# Patient Record
Sex: FEMALE | Race: WHITE | NOT HISPANIC OR LATINO | Employment: UNEMPLOYED | URBAN - METROPOLITAN AREA
[De-identification: names, ages, dates, MRNs, and addresses within clinical notes are randomized per-mention and may not be internally consistent; named-entity substitution may affect disease eponyms.]

---

## 2019-09-10 ENCOUNTER — APPOINTMENT (OUTPATIENT)
Dept: RADIOLOGY | Facility: MEDICAL CENTER | Age: 39
End: 2019-09-10
Attending: EMERGENCY MEDICINE
Payer: MEDICAID

## 2019-09-10 ENCOUNTER — HOSPITAL ENCOUNTER (EMERGENCY)
Facility: MEDICAL CENTER | Age: 39
End: 2019-09-10
Attending: EMERGENCY MEDICINE
Payer: MEDICAID

## 2019-09-10 VITALS
RESPIRATION RATE: 20 BRPM | SYSTOLIC BLOOD PRESSURE: 175 MMHG | HEART RATE: 79 BPM | OXYGEN SATURATION: 93 % | BODY MASS INDEX: 26 KG/M2 | WEIGHT: 151.46 LBS | DIASTOLIC BLOOD PRESSURE: 108 MMHG | TEMPERATURE: 98.6 F

## 2019-09-10 DIAGNOSIS — R10.2 PELVIC PAIN: ICD-10-CM

## 2019-09-10 LAB
ALBUMIN SERPL BCP-MCNC: 5 G/DL (ref 3.2–4.9)
ALBUMIN/GLOB SERPL: 1.5 G/DL
ALP SERPL-CCNC: 79 U/L (ref 30–99)
ALT SERPL-CCNC: 29 U/L (ref 2–50)
ANION GAP SERPL CALC-SCNC: 17 MMOL/L (ref 0–11.9)
APPEARANCE UR: ABNORMAL
AST SERPL-CCNC: 15 U/L (ref 12–45)
BACTERIA #/AREA URNS HPF: NEGATIVE /HPF
BACTERIA GENITAL QL WET PREP: NORMAL
BASOPHILS # BLD AUTO: 0.5 % (ref 0–1.8)
BASOPHILS # BLD: 0.09 K/UL (ref 0–0.12)
BILIRUB SERPL-MCNC: 0.5 MG/DL (ref 0.1–1.5)
BILIRUB UR QL STRIP.AUTO: NEGATIVE
BUN SERPL-MCNC: 7 MG/DL (ref 8–22)
CALCIUM SERPL-MCNC: 9.8 MG/DL (ref 8.4–10.2)
CHLORIDE SERPL-SCNC: 104 MMOL/L (ref 96–112)
CO2 SERPL-SCNC: 21 MMOL/L (ref 20–33)
COLOR UR: YELLOW
CREAT SERPL-MCNC: 0.7 MG/DL (ref 0.5–1.4)
EOSINOPHIL # BLD AUTO: 0.09 K/UL (ref 0–0.51)
EOSINOPHIL NFR BLD: 0.5 % (ref 0–6.9)
EPI CELLS #/AREA URNS HPF: ABNORMAL /HPF
ERYTHROCYTE [DISTWIDTH] IN BLOOD BY AUTOMATED COUNT: 39.2 FL (ref 35.9–50)
GLOBULIN SER CALC-MCNC: 3.3 G/DL (ref 1.9–3.5)
GLUCOSE SERPL-MCNC: 143 MG/DL (ref 65–99)
GLUCOSE UR STRIP.AUTO-MCNC: NEGATIVE MG/DL
HCG SERPL QL: NEGATIVE
HCT VFR BLD AUTO: 48.5 % (ref 37–47)
HGB BLD-MCNC: 17.2 G/DL (ref 12–16)
IMM GRANULOCYTES # BLD AUTO: 0.08 K/UL (ref 0–0.11)
IMM GRANULOCYTES NFR BLD AUTO: 0.5 % (ref 0–0.9)
KETONES UR STRIP.AUTO-MCNC: NEGATIVE MG/DL
LEUKOCYTE ESTERASE UR QL STRIP.AUTO: NEGATIVE
LIPASE SERPL-CCNC: 56 U/L (ref 7–58)
LYMPHOCYTES # BLD AUTO: 3.11 K/UL (ref 1–4.8)
LYMPHOCYTES NFR BLD: 18.6 % (ref 22–41)
MCH RBC QN AUTO: 30.7 PG (ref 27–33)
MCHC RBC AUTO-ENTMCNC: 35.5 G/DL (ref 33.6–35)
MCV RBC AUTO: 86.5 FL (ref 81.4–97.8)
MICRO URNS: ABNORMAL
MONOCYTES # BLD AUTO: 1.15 K/UL (ref 0–0.85)
MONOCYTES NFR BLD AUTO: 6.9 % (ref 0–13.4)
NEUTROPHILS # BLD AUTO: 12.18 K/UL (ref 2–7.15)
NEUTROPHILS NFR BLD: 73 % (ref 44–72)
NITRITE UR QL STRIP.AUTO: NEGATIVE
NRBC # BLD AUTO: 0 K/UL
NRBC BLD-RTO: 0 /100 WBC
PH UR STRIP.AUTO: 7.5 [PH] (ref 5–8)
PLATELET # BLD AUTO: 446 K/UL (ref 164–446)
PMV BLD AUTO: 8.1 FL (ref 9–12.9)
POTASSIUM SERPL-SCNC: 3.5 MMOL/L (ref 3.6–5.5)
PROT SERPL-MCNC: 8.3 G/DL (ref 6–8.2)
PROT UR QL STRIP: NEGATIVE MG/DL
RBC # BLD AUTO: 5.61 M/UL (ref 4.2–5.4)
RBC # URNS HPF: ABNORMAL /HPF
RBC UR QL AUTO: ABNORMAL
SIGNIFICANT IND 70042: NORMAL
SITE SITE: NORMAL
SODIUM SERPL-SCNC: 142 MMOL/L (ref 135–145)
SOURCE SOURCE: NORMAL
SP GR UR STRIP.AUTO: 1.01
WBC # BLD AUTO: 16.7 K/UL (ref 4.8–10.8)
WBC #/AREA URNS HPF: ABNORMAL /HPF

## 2019-09-10 PROCEDURE — 87591 N.GONORRHOEAE DNA AMP PROB: CPT

## 2019-09-10 PROCEDURE — 99285 EMERGENCY DEPT VISIT HI MDM: CPT

## 2019-09-10 PROCEDURE — 96376 TX/PRO/DX INJ SAME DRUG ADON: CPT

## 2019-09-10 PROCEDURE — 96375 TX/PRO/DX INJ NEW DRUG ADDON: CPT

## 2019-09-10 PROCEDURE — 83690 ASSAY OF LIPASE: CPT

## 2019-09-10 PROCEDURE — 96374 THER/PROPH/DIAG INJ IV PUSH: CPT

## 2019-09-10 PROCEDURE — 80053 COMPREHEN METABOLIC PANEL: CPT

## 2019-09-10 PROCEDURE — 36415 COLL VENOUS BLD VENIPUNCTURE: CPT

## 2019-09-10 PROCEDURE — 700105 HCHG RX REV CODE 258: Performed by: EMERGENCY MEDICINE

## 2019-09-10 PROCEDURE — 85025 COMPLETE CBC W/AUTO DIFF WBC: CPT

## 2019-09-10 PROCEDURE — 87491 CHLMYD TRACH DNA AMP PROBE: CPT

## 2019-09-10 PROCEDURE — 74176 CT ABD & PELVIS W/O CONTRAST: CPT

## 2019-09-10 PROCEDURE — 76856 US EXAM PELVIC COMPLETE: CPT

## 2019-09-10 PROCEDURE — 84703 CHORIONIC GONADOTROPIN ASSAY: CPT

## 2019-09-10 PROCEDURE — 81001 URINALYSIS AUTO W/SCOPE: CPT

## 2019-09-10 PROCEDURE — 700111 HCHG RX REV CODE 636 W/ 250 OVERRIDE (IP): Performed by: EMERGENCY MEDICINE

## 2019-09-10 RX ORDER — MORPHINE SULFATE 4 MG/ML
4 INJECTION, SOLUTION INTRAMUSCULAR; INTRAVENOUS ONCE
Status: COMPLETED | OUTPATIENT
Start: 2019-09-10 | End: 2019-09-10

## 2019-09-10 RX ORDER — ONDANSETRON 2 MG/ML
4 INJECTION INTRAMUSCULAR; INTRAVENOUS ONCE
Status: COMPLETED | OUTPATIENT
Start: 2019-09-10 | End: 2019-09-10

## 2019-09-10 RX ORDER — SODIUM CHLORIDE 9 MG/ML
1000 INJECTION, SOLUTION INTRAVENOUS ONCE
Status: COMPLETED | OUTPATIENT
Start: 2019-09-10 | End: 2019-09-10

## 2019-09-10 RX ADMIN — MORPHINE SULFATE 4 MG: 4 INJECTION INTRAVENOUS at 13:16

## 2019-09-10 RX ADMIN — ONDANSETRON 4 MG: 2 INJECTION INTRAMUSCULAR; INTRAVENOUS at 10:54

## 2019-09-10 RX ADMIN — SODIUM CHLORIDE 1000 ML: 9 INJECTION, SOLUTION INTRAVENOUS at 10:54

## 2019-09-10 RX ADMIN — FENTANYL CITRATE 100 MCG: 50 INJECTION, SOLUTION INTRAMUSCULAR; INTRAVENOUS at 10:54

## 2019-09-10 RX ADMIN — ONDANSETRON 4 MG: 2 INJECTION INTRAMUSCULAR; INTRAVENOUS at 13:17

## 2019-09-10 RX ADMIN — FENTANYL CITRATE 100 MCG: 50 INJECTION, SOLUTION INTRAMUSCULAR; INTRAVENOUS at 11:39

## 2019-09-10 ASSESSMENT — PAIN DESCRIPTION - DESCRIPTORS: DESCRIPTORS: SHARP

## 2019-09-10 NOTE — ED NOTES
"Attempted to review discharge paperwork w/ pt.  Pt refused to review or sign paperwork, stated \"abdominal pain is not my complaint.\"  Pt stated has called for a cab ride to [facility], instructed not to drive after receiving Narcotics.  Pt stated, \"I am a mother, I'm not going to drive.\"  Again offered for pt to speak to another ERP, apologized pt unhappy w/ care.  Pt stated, \"no you have been nice, but I'm not sure the doctor cares.  Pt declined reassessment of VS, again refused review of discharge paperwork.  Pt ambulated from ED.    "

## 2019-09-10 NOTE — ED NOTES
"Pt restless, anxious, dry heaving, rocking back and forth on gurney.  Pt c/o \"severe\" pain to RLQ, stating \"I know I have a torsion again.\"  Pt repositioned on gurney for comfort.  Pt reports pain started around 0200 this morning, around 0400 began to have increased N/V, unable to get up off BR floor without assistance from family member.  Pt reports has HX of irregular cycles, states this pain feels completely different.  Pt also c/o increased bleeding w/ clots, states is abnormal from regular cycles.  1048:  PIV placed in RAC, blood drawn and sent to lab.    1054:  Medicated as ordered  1125:  US at bedside    "

## 2019-09-10 NOTE — ED NOTES
"Pt ambulated out of the room to nurses station, stated \"I want that information sent to the other [facility].  I will be going to them to get better care.\"  Asked pt if would like to speak to another ERP, pt stated \"No, I will go there where I can get Gynecological care.\"  Offered again for pt to speak to another ERP, pt declined.    "

## 2019-09-10 NOTE — ED NOTES
Med rec updated and complete  Allergies reviewed  Pt reports no vitamins.   Pt reports no antibiotics in the last 2 weeks

## 2019-09-10 NOTE — ED NOTES
Rounded on patient.  Medicated per orders.  Patient asked about food, was asked to wait and see if nausea and vomiting subsides then ERP will be asked.

## 2019-09-10 NOTE — ED PROVIDER NOTES
ED Provider Note    CHIEF COMPLAINT  Chief Complaint   Patient presents with   • Groin Pain     ovaries right         HPI  Alan Penaloza is a 38 y.o. female who presents with abdominal pain.  Started last night.  In the groin.  Severe relatively rapid onset.  Sharp stabbing.  Radiates to the right side.  Unbearable pain.  She does have heavy painful periods, but this seems worse than normal.  She just started menses last night.  She has had associated nausea and has been vomiting.  No diarrhea.  Is not had a fever or chills.  Denies chest pain shortness of breath, abnormal foods, known ill exposure.  No dysuria hematuria frequency.    Patient does have a history of recurrent abdominal pain.  She reports that she frequently will get kidney stones when she menstruates.  She has had an ovarian torsion on the left and is worried she might have a torsion on the right.  She has a history of endometriosis.  She had been seeing pain management for her chronic neck and back pain.  She ended up having a blood alcohol test that was positive and care was discontinued.  She has not taken morphine or Norco in the last month because of this.    REVIEW OF SYSTEMS  As above  All other systems are negative.     PAST MEDICAL HISTORY  Past Medical History:   Diagnosis Date   • Abdominal pain 1/13/2011   • Endometriosis    • Hydronephrosis, right 1/13/2011   • Kidney stones     renal stent placement   • Ovarian torsion    • Psychiatric problem     anxiety   • Renal calculi 1/13/2011   • Right flank pain 1/13/2011       FAMILY HISTORY  History reviewed. No pertinent family history.    SOCIAL HISTORY  Social History     Tobacco Use   • Smoking status: Current Every Day Smoker   • Smokeless tobacco: Never Used   Substance Use Topics   • Alcohol use: No   • Drug use: No       SURGICAL HISTORY  Past Surgical History:   Procedure Laterality Date   • GYN SURGERY      left ovary removed 2007   • OTHER ABDOMINAL SURGERY      intestinal  surgery 1980       CURRENT MEDICATIONS  Home Medications     Reviewed by Atilio Hardy (Pharmacy Tech) on 09/10/19 at 1041  Med List Status: Complete   Medication Last Dose Status   naproxen (NAPROSYN) 250 MG TABS 9/10/2019 Active   Pseudoephedrine HCl (SUDAFED PO) 9/9/2019 Active   Pseudoephedrine-APAP-DM (QC DAYTIME COLD MEDICINE PO) 9/9/2019 Active                ALLERGIES  Allergies   Allergen Reactions   • Ativan [Lorazepam]      Pt reports that it makes her hallucinate    • Droperidol Hives and Shortness of Breath   • Iodine Rash   • Penicillins Hives   • Phenergan [Promethazine Hcl] Hives   • Reglan [Metoclopramide Hcl] Hives   • Rocephin [Ceftriaxone Sodium] Hives   • Toradol Vomiting and Nausea       PHYSICAL EXAM  VITAL SIGNS: BP (!) 230/159   Pulse 82   Temp 37 °C (98.6 °F) (Temporal)   Resp 20   Wt 68.7 kg (151 lb 7.3 oz)   SpO2 98%   BMI 26.00 kg/m²   Constitutional: Awake and alert.  Actively throwing up in a trash can   hENT: Dry mucous membranes  Eyes: Sclera white  Neck: Normal range of motion  Cardiovascular: Normal heart rate, Normal rhythm  Thorax & Lungs: Normal breath sounds, No respiratory distress, No wheezing, No chest tenderness.   Abdomen: Tender to palpation across the lower abdomen worse in the right than on the left.  No peritonitis  Pelvic Exam:   Normal external genitalia with Normal vulva.  There is menstrual blood within the vault.  No adnexal mass.  There is pelvic tenderness diffusely.  No chandelier sign.  Skin: No rash.   Back: No tenderness, No CVA tenderness.   Extremities: Intact, symmetric distal pulses, no edema.  Neurologic: Grossly normal    RADIOLOGY/PROCEDURES  CT-RENAL COLIC EVALUATION(A/P W/O)   Final Result      1.  Small nonobstructing LEFT kidney stones.   2.  No ureteral stone or hydronephrosis.   3.  Probable small LEFT kidney cysts although evaluation is limited due to lack of IV contrast.   4.  Normal appendix.   5.  No focal mesenteric  inflammatory process demonstrated.      US-PELVIC COMPLETE (TRANSABDOMINAL/TRANSVAGINAL) (COMBO)   Final Result         A complex 1.6 cm cystic lesion in the right ovary with echogenic component could relate to a complex ovarian cyst. Follow-up is recommended to ensure resolution         Imaging is interpreted by radiologist    Labs:   Results for orders placed or performed during the hospital encounter of 09/10/19   CBC WITH DIFFERENTIAL   Result Value Ref Range    WBC 16.7 (H) 4.8 - 10.8 K/uL    RBC 5.61 (H) 4.20 - 5.40 M/uL    Hemoglobin 17.2 (H) 12.0 - 16.0 g/dL    Hematocrit 48.5 (H) 37.0 - 47.0 %    MCV 86.5 81.4 - 97.8 fL    MCH 30.7 27.0 - 33.0 pg    MCHC 35.5 (H) 33.6 - 35.0 g/dL    RDW 39.2 35.9 - 50.0 fL    Platelet Count 446 164 - 446 K/uL    MPV 8.1 (L) 9.0 - 12.9 fL    Neutrophils-Polys 73.00 (H) 44.00 - 72.00 %    Lymphocytes 18.60 (L) 22.00 - 41.00 %    Monocytes 6.90 0.00 - 13.40 %    Eosinophils 0.50 0.00 - 6.90 %    Basophils 0.50 0.00 - 1.80 %    Immature Granulocytes 0.50 0.00 - 0.90 %    Nucleated RBC 0.00 /100 WBC    Neutrophils (Absolute) 12.18 (H) 2.00 - 7.15 K/uL    Lymphs (Absolute) 3.11 1.00 - 4.80 K/uL    Monos (Absolute) 1.15 (H) 0.00 - 0.85 K/uL    Eos (Absolute) 0.09 0.00 - 0.51 K/uL    Baso (Absolute) 0.09 0.00 - 0.12 K/uL    Immature Granulocytes (abs) 0.08 0.00 - 0.11 K/uL    NRBC (Absolute) 0.00 K/uL   COMP METABOLIC PANEL   Result Value Ref Range    Sodium 142 135 - 145 mmol/L    Potassium 3.5 (L) 3.6 - 5.5 mmol/L    Chloride 104 96 - 112 mmol/L    Co2 21 20 - 33 mmol/L    Anion Gap 17.0 (H) 0.0 - 11.9    Glucose 143 (H) 65 - 99 mg/dL    Bun 7 (L) 8 - 22 mg/dL    Creatinine 0.70 0.50 - 1.40 mg/dL    Calcium 9.8 8.4 - 10.2 mg/dL    AST(SGOT) 15 12 - 45 U/L    ALT(SGPT) 29 2 - 50 U/L    Alkaline Phosphatase 79 30 - 99 U/L    Total Bilirubin 0.5 0.1 - 1.5 mg/dL    Albumin 5.0 (H) 3.2 - 4.9 g/dL    Total Protein 8.3 (H) 6.0 - 8.2 g/dL    Globulin 3.3 1.9 - 3.5 g/dL    A-G Ratio 1.5  g/dL   LIPASE   Result Value Ref Range    Lipase 56 7 - 58 U/L   URINALYSIS CULTURE, IF INDICATED   Result Value Ref Range    Color Yellow     Character Hazy (A)     Specific Gravity 1.010 <1.035    Ph 7.5 5.0 - 8.0    Glucose Negative Negative mg/dL    Ketones Negative Negative mg/dL    Protein Negative Negative mg/dL    Bilirubin Negative Negative    Nitrite Negative Negative    Leukocyte Esterase Negative Negative    Occult Blood Large (A) Negative    Micro Urine Req Microscopic    HCG QUAL SERUM   Result Value Ref Range    Beta-Hcg Qualitative Serum Negative Negative   ESTIMATED GFR   Result Value Ref Range    GFR If African American >60 >60 mL/min/1.73 m 2    GFR If Non African American >60 >60 mL/min/1.73 m 2   URINE MICROSCOPIC (W/UA)   Result Value Ref Range    WBC Rare /hpf    RBC 20-50 (A) /hpf    Bacteria Negative None /hpf    Epithelial Cells Rare Few /hpf       Medications   NS infusion 1,000 mL (0 mL Intravenous Stopped 9/10/19 1217)   fentaNYL (SUBLIMAZE) injection 100 mcg (100 mcg Intravenous Given 9/10/19 1054)   ondansetron (ZOFRAN) syringe/vial injection 4 mg (4 mg Intravenous Given 9/10/19 1054)   fentaNYL (SUBLIMAZE) injection 100 mcg (100 mcg Intravenous Given 9/10/19 1139)   morphine (pf) 4 mg/ml injection 4 mg (4 mg Intravenous Given 9/10/19 1316)   ondansetron (ZOFRAN) syringe/vial injection 4 mg (4 mg Intravenous Given 9/10/19 1317)       Hydration: Patient was given IV fluids because of acute vomiting.  Oral fluids were not appropriate because of a possible surgical problem.  On recheck the patient was stable    Narcotic score 450.    COURSE & MEDICAL DECISION MAKING  Presents with severe right lower quadrant abdominal pain.  Was hypertensive and actively vomiting upon presentation.  An IV was started.  She was given fentanyl intravenously.  She was hydrated with saline.  Required additional doses of fentanyl and Zofran with persistent symptoms.  Obtained a pelvic ultrasound initially  while she was having severe pain.  She has good flow to the ovary.  Given that she had severe symptoms during ultrasound torsion felt unlikely.  She does have a small cyst but does not appear to be of any emergent consequence.  Also within the differential is appendicitis and kidney stone.  She has an iodine allergy.  CT scan without contrast was obtained.  She does not have right-sided ureteral stone.  The appendix was seen was normal.  She did not have any findings consistent with PID on her examination.  She is noted to have an elevated WBC count, but this is about the same as they have been during her previous visits and on review of the chart it appears she has chronic pelvic pain.  She has chronic opiate use that was recently discontinued.  This is all likely complicating her recurrent pelvic discomfort.  After the patient's time in the ER she does have improved symptoms.  I do not have an explanation for her pain presently, but does not appear to have an emergency process.  At this point she will be discharged.  I advised Tylenol and/or ibuprofen.  Advised she would need to see a pain management provider for opiates if needed given this sounds to be a rather chronic recurrent problem..  I have given her the number for gynecology to call, Dr. Hartman.  Given the number for the [facility] clinic.  Precaution patient to return the ER for fever, uncontrolled symptoms or concern.      FINAL IMPRESSION  1.  Pelvic pain  2.  Chronic recurrent pain syndrome  3.  Chronic opiate use with recent discontinuation        This dictation was created using voice recognition software. The accuracy of the dictation is limited to the abilities of the software.  The nursing notes were reviewed and certain aspects of this information were incorporated into this note.    Electronically signed by: Oscar Livingston, 9/10/2019 1:19 PM

## 2019-09-11 LAB
C TRACH DNA SPEC QL NAA+PROBE: NEGATIVE
N GONORRHOEA DNA SPEC QL NAA+PROBE: NEGATIVE
SPECIMEN SOURCE: NORMAL

## 2020-01-18 ENCOUNTER — APPOINTMENT (OUTPATIENT)
Dept: RADIOLOGY | Facility: MEDICAL CENTER | Age: 40
End: 2020-01-18
Attending: EMERGENCY MEDICINE
Payer: MEDICAID

## 2020-01-18 ENCOUNTER — HOSPITAL ENCOUNTER (EMERGENCY)
Facility: MEDICAL CENTER | Age: 40
End: 2020-01-18
Attending: EMERGENCY MEDICINE
Payer: MEDICAID

## 2020-01-18 VITALS
SYSTOLIC BLOOD PRESSURE: 177 MMHG | BODY MASS INDEX: 25.01 KG/M2 | TEMPERATURE: 98.6 F | DIASTOLIC BLOOD PRESSURE: 110 MMHG | WEIGHT: 165 LBS | HEIGHT: 68 IN | HEART RATE: 94 BPM | RESPIRATION RATE: 18 BRPM | OXYGEN SATURATION: 95 %

## 2020-01-18 DIAGNOSIS — S09.90XA CLOSED HEAD INJURY, INITIAL ENCOUNTER: ICD-10-CM

## 2020-01-18 DIAGNOSIS — S16.1XXA STRAIN OF NECK MUSCLE, INITIAL ENCOUNTER: ICD-10-CM
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
## 2020-01-18 DIAGNOSIS — T14.8XXA ABRASION: ICD-10-CM

## 2020-01-18 LAB
ABO GROUP BLD: NORMAL
ALBUMIN SERPL BCP-MCNC: 4.5 G/DL (ref 3.2–4.9)
ALBUMIN/GLOB SERPL: 1.4 G/DL
ALP SERPL-CCNC: 81 U/L (ref 30–99)
ALT SERPL-CCNC: 17 U/L (ref 2–50)
ANION GAP SERPL CALC-SCNC: 10 MMOL/L (ref 0–11.9)
APTT PPP: 28.3 SEC (ref 24.7–36)
AST SERPL-CCNC: 14 U/L (ref 12–45)
BILIRUB SERPL-MCNC: 0.4 MG/DL (ref 0.1–1.5)
BLD GP AB SCN SERPL QL: NORMAL
BUN SERPL-MCNC: 11 MG/DL (ref 8–22)
CALCIUM SERPL-MCNC: 9.4 MG/DL (ref 8.5–10.5)
CHLORIDE SERPL-SCNC: 107 MMOL/L (ref 96–112)
CO2 SERPL-SCNC: 24 MMOL/L (ref 20–33)
CREAT SERPL-MCNC: 0.86 MG/DL (ref 0.5–1.4)
ERYTHROCYTE [DISTWIDTH] IN BLOOD BY AUTOMATED COUNT: 43.7 FL (ref 35.9–50)
ETHANOL BLD-MCNC: 0.18 G/DL
GLOBULIN SER CALC-MCNC: 3.2 G/DL (ref 1.9–3.5)
GLUCOSE SERPL-MCNC: 131 MG/DL (ref 65–99)
HCG SERPL QL: NEGATIVE
HCT VFR BLD AUTO: 47.5 % (ref 37–47)
HGB BLD-MCNC: 16.3 G/DL (ref 12–16)
INR PPP: 0.88 (ref 0.87–1.13)
MCH RBC QN AUTO: 30.9 PG (ref 27–33)
MCHC RBC AUTO-ENTMCNC: 34.3 G/DL (ref 33.6–35)
MCV RBC AUTO: 90.1 FL (ref 81.4–97.8)
PLATELET # BLD AUTO: 367 K/UL (ref 164–446)
PMV BLD AUTO: 8.1 FL (ref 9–12.9)
POTASSIUM SERPL-SCNC: 3.3 MMOL/L (ref 3.6–5.5)
PROT SERPL-MCNC: 7.7 G/DL (ref 6–8.2)
PROTHROMBIN TIME: 12.1 SEC (ref 12–14.6)
RBC # BLD AUTO: 5.27 M/UL (ref 4.2–5.4)
RH BLD: NORMAL
SODIUM SERPL-SCNC: 141 MMOL/L (ref 135–145)
WBC # BLD AUTO: 14.2 K/UL (ref 4.8–10.8)

## 2020-01-18 PROCEDURE — 85027 COMPLETE CBC AUTOMATED: CPT

## 2020-01-18 PROCEDURE — 96374 THER/PROPH/DIAG INJ IV PUSH: CPT

## 2020-01-18 PROCEDURE — 85730 THROMBOPLASTIN TIME PARTIAL: CPT

## 2020-01-18 PROCEDURE — 71250 CT THORAX DX C-: CPT

## 2020-01-18 PROCEDURE — 80053 COMPREHEN METABOLIC PANEL: CPT

## 2020-01-18 PROCEDURE — 80307 DRUG TEST PRSMV CHEM ANLYZR: CPT

## 2020-01-18 PROCEDURE — 70486 CT MAXILLOFACIAL W/O DYE: CPT

## 2020-01-18 PROCEDURE — 84703 CHORIONIC GONADOTROPIN ASSAY: CPT

## 2020-01-18 PROCEDURE — A9270 NON-COVERED ITEM OR SERVICE: HCPCS | Performed by: EMERGENCY MEDICINE

## 2020-01-18 PROCEDURE — 70450 CT HEAD/BRAIN W/O DYE: CPT

## 2020-01-18 PROCEDURE — 72125 CT NECK SPINE W/O DYE: CPT

## 2020-01-18 PROCEDURE — 99285 EMERGENCY DEPT VISIT HI MDM: CPT

## 2020-01-18 PROCEDURE — 86850 RBC ANTIBODY SCREEN: CPT

## 2020-01-18 PROCEDURE — 700111 HCHG RX REV CODE 636 W/ 250 OVERRIDE (IP): Performed by: EMERGENCY MEDICINE

## 2020-01-18 PROCEDURE — 72131 CT LUMBAR SPINE W/O DYE: CPT

## 2020-01-18 PROCEDURE — 700102 HCHG RX REV CODE 250 W/ 637 OVERRIDE(OP): Performed by: EMERGENCY MEDICINE

## 2020-01-18 PROCEDURE — 72128 CT CHEST SPINE W/O DYE: CPT

## 2020-01-18 PROCEDURE — 86900 BLOOD TYPING SEROLOGIC ABO: CPT

## 2020-01-18 PROCEDURE — 307740 HCHG GREEN TRAUMA TEAM SERVICES

## 2020-01-18 PROCEDURE — 86901 BLOOD TYPING SEROLOGIC RH(D): CPT

## 2020-01-18 PROCEDURE — 85610 PROTHROMBIN TIME: CPT

## 2020-01-18 RX ORDER — HYDROCODONE BITARTRATE AND ACETAMINOPHEN 5; 325 MG/1; MG/1
1 TABLET ORAL EVERY 6 HOURS PRN
Qty: 10 TAB | Refills: 0 | Status: SHIPPED | OUTPATIENT
Start: 2020-01-18 | End: 2020-01-23

## 2020-01-18 RX ORDER — ACETAMINOPHEN 325 MG/1
650 TABLET ORAL ONCE
Status: COMPLETED | OUTPATIENT
Start: 2020-01-18 | End: 2020-01-18

## 2020-01-18 RX ORDER — HYDROCODONE BITARTRATE AND ACETAMINOPHEN 5; 325 MG/1; MG/1
1 TABLET ORAL ONCE
Status: DISCONTINUED | OUTPATIENT
Start: 2020-01-18 | End: 2020-01-18

## 2020-01-18 RX ORDER — ONDANSETRON 2 MG/ML
4 INJECTION INTRAMUSCULAR; INTRAVENOUS ONCE
Status: COMPLETED | OUTPATIENT
Start: 2020-01-18 | End: 2020-01-18

## 2020-01-18 RX ADMIN — ACETAMINOPHEN 650 MG: 325 TABLET, FILM COATED ORAL at 19:30

## 2020-01-18 RX ADMIN — ONDANSETRON 4 MG: 2 INJECTION INTRAMUSCULAR; INTRAVENOUS at 20:01

## 2020-01-18 ASSESSMENT — LIFESTYLE VARIABLES
HAVE YOU EVER FELT YOU SHOULD CUT DOWN ON YOUR DRINKING: NO
EVER HAD A DRINK FIRST THING IN THE MORNING TO STEADY YOUR NERVES TO GET RID OF A HANGOVER: NO
HOW MANY TIMES IN THE PAST YEAR HAVE YOU HAD 5 OR MORE DRINKS IN A DAY: 3
ON A TYPICAL DAY WHEN YOU DRINK ALCOHOL HOW MANY DRINKS DO YOU HAVE: 1
DO YOU DRINK ALCOHOL: YES
EVER FELT BAD OR GUILTY ABOUT YOUR DRINKING: NO
CONSUMPTION TOTAL: POSITIVE
AVERAGE NUMBER OF DAYS PER WEEK YOU HAVE A DRINK CONTAINING ALCOHOL: 1
TOTAL SCORE: 0
HAVE PEOPLE ANNOYED YOU BY CRITICIZING YOUR DRINKING: NO

## 2020-01-18 ASSESSMENT — ENCOUNTER SYMPTOMS
HEADACHES: 1
NECK PAIN: 1
BACK PAIN: 1
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
## 2020-01-19 NOTE — ED NOTES
Pt ambuating and A & O x 4. Pt's sister at bedside and pt's father waiting for pt in lobby to provide ride home. Pt verbalized understanding to instructions and prescriptions. Pt given w/c ride to lobby.
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
## 2020-02-04 ENCOUNTER — HOSPITAL ENCOUNTER (EMERGENCY)
Facility: MEDICAL CENTER | Age: 40
End: 2020-02-04
Attending: EMERGENCY MEDICINE
Payer: MEDICAID

## 2020-02-04 VITALS
TEMPERATURE: 97.5 F | WEIGHT: 167.99 LBS | RESPIRATION RATE: 18 BRPM | DIASTOLIC BLOOD PRESSURE: 100 MMHG | HEART RATE: 90 BPM | SYSTOLIC BLOOD PRESSURE: 178 MMHG | OXYGEN SATURATION: 99 % | BODY MASS INDEX: 27.99 KG/M2 | HEIGHT: 65 IN

## 2020-02-04 DIAGNOSIS — F07.81 POST CONCUSSIVE SYNDROME: ICD-10-CM

## 2020-02-04 PROCEDURE — 99283 EMERGENCY DEPT VISIT LOW MDM: CPT

## 2020-02-04 RX ORDER — HYDROCODONE BITARTRATE AND ACETAMINOPHEN 5; 325 MG/1; MG/1
1-2 TABLET ORAL EVERY 6 HOURS PRN
Qty: 15 TAB | Refills: 0 | Status: SHIPPED | OUTPATIENT
Start: 2020-02-04 | End: 2020-02-08

## 2020-02-04 RX ORDER — METHYLPREDNISOLONE 4 MG/1
TABLET ORAL
Qty: 1 PACKAGE | Refills: 0 | Status: SHIPPED | OUTPATIENT
Start: 2020-02-04 | End: 2020-03-22

## 2020-02-04 RX ORDER — ONDANSETRON 4 MG/1
4 TABLET, ORALLY DISINTEGRATING ORAL EVERY 6 HOURS PRN
Qty: 10 TAB | Refills: 1 | Status: SHIPPED | OUTPATIENT
Start: 2020-02-04 | End: 2020-03-22

## 2020-02-04 RX ORDER — LISINOPRIL 10 MG/1
10 TABLET ORAL DAILY
Qty: 30 TAB | Refills: 0 | Status: ON HOLD | OUTPATIENT
Start: 2020-02-04 | End: 2020-03-25 | Stop reason: SDUPTHER
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
## 2020-02-04 NOTE — ED NOTES
Pt provided discharge instructions. Pt verbalized understanding. Pt leaving ER in stable condition, pt ambulatory with steady gait.

## 2020-02-04 NOTE — ED NOTES
Pt to room from [facility]. Changed into gown. Connected to monitor. Agree with triage note. Chart up for ERP. Call light in reach.       Pt complaining of foggy vision as well. Pt has been taking napoxen, complaining of stiff neck and that she cannot turn her neck w/o pain from side to side
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
## 2020-03-22 ENCOUNTER — APPOINTMENT (OUTPATIENT)
Dept: RADIOLOGY | Facility: MEDICAL CENTER | Age: 40
End: 2020-03-22
Attending: INTERNAL MEDICINE
Payer: MEDICAID

## 2020-03-22 ENCOUNTER — HOSPITAL ENCOUNTER (OUTPATIENT)
Facility: MEDICAL CENTER | Age: 40
End: 2020-03-25
Attending: EMERGENCY MEDICINE | Admitting: INTERNAL MEDICINE
Payer: MEDICAID

## 2020-03-22 ENCOUNTER — APPOINTMENT (OUTPATIENT)
Dept: RADIOLOGY | Facility: MEDICAL CENTER | Age: 40
End: 2020-03-22
Attending: EMERGENCY MEDICINE
Payer: MEDICAID

## 2020-03-22 DIAGNOSIS — K85.90 ACUTE PANCREATITIS, UNSPECIFIED COMPLICATION STATUS, UNSPECIFIED PANCREATITIS TYPE: ICD-10-CM

## 2020-03-22 DIAGNOSIS — R07.9 CHEST PAIN, UNSPECIFIED TYPE: ICD-10-CM

## 2020-03-22 DIAGNOSIS — F41.9 ANXIETY: ICD-10-CM

## 2020-03-22 PROBLEM — Z72.0 TOBACCO ABUSE: Status: ACTIVE | Noted: 2020-03-22

## 2020-03-22 PROBLEM — N17.9 AKI (ACUTE KIDNEY INJURY) (HCC): Status: ACTIVE | Noted: 2020-03-22

## 2020-03-22 LAB
ALBUMIN SERPL BCP-MCNC: 4.4 G/DL (ref 3.2–4.9)
ALBUMIN/GLOB SERPL: 1.4 G/DL
ALP SERPL-CCNC: 67 U/L (ref 30–99)
ALT SERPL-CCNC: 12 U/L (ref 2–50)
AMPHET UR QL SCN: NEGATIVE
ANION GAP SERPL CALC-SCNC: 13 MMOL/L (ref 7–16)
AST SERPL-CCNC: 15 U/L (ref 12–45)
BARBITURATES UR QL SCN: NEGATIVE
BASOPHILS # BLD AUTO: 0.7 % (ref 0–1.8)
BASOPHILS # BLD: 0.1 K/UL (ref 0–0.12)
BENZODIAZ UR QL SCN: POSITIVE
BILIRUB SERPL-MCNC: 0.3 MG/DL (ref 0.1–1.5)
BUN SERPL-MCNC: 30 MG/DL (ref 8–22)
BZE UR QL SCN: NEGATIVE
CALCIUM SERPL-MCNC: 9.4 MG/DL (ref 8.5–10.5)
CANNABINOIDS UR QL SCN: POSITIVE
CHLORIDE SERPL-SCNC: 105 MMOL/L (ref 96–112)
CO2 SERPL-SCNC: 18 MMOL/L (ref 20–33)
CREAT SERPL-MCNC: 1.15 MG/DL (ref 0.5–1.4)
D DIMER PPP IA.FEU-MCNC: 0.27 UG/ML (FEU) (ref 0–0.5)
EKG IMPRESSION: NORMAL
EOSINOPHIL # BLD AUTO: 0.17 K/UL (ref 0–0.51)
EOSINOPHIL NFR BLD: 1.2 % (ref 0–6.9)
ERYTHROCYTE [DISTWIDTH] IN BLOOD BY AUTOMATED COUNT: 43.8 FL (ref 35.9–50)
GLOBULIN SER CALC-MCNC: 3.1 G/DL (ref 1.9–3.5)
GLUCOSE SERPL-MCNC: 82 MG/DL (ref 65–99)
HCG SERPL QL: NEGATIVE
HCT VFR BLD AUTO: 41.9 % (ref 37–47)
HGB BLD-MCNC: 14.4 G/DL (ref 12–16)
IMM GRANULOCYTES # BLD AUTO: 0.06 K/UL (ref 0–0.11)
IMM GRANULOCYTES NFR BLD AUTO: 0.4 % (ref 0–0.9)
LIPASE SERPL-CCNC: 87 U/L (ref 11–82)
LYMPHOCYTES # BLD AUTO: 4.32 K/UL (ref 1–4.8)
LYMPHOCYTES NFR BLD: 31.5 % (ref 22–41)
MCH RBC QN AUTO: 31.9 PG (ref 27–33)
MCHC RBC AUTO-ENTMCNC: 34.4 G/DL (ref 33.6–35)
MCV RBC AUTO: 92.7 FL (ref 81.4–97.8)
METHADONE UR QL SCN: NEGATIVE
MONOCYTES # BLD AUTO: 1.53 K/UL (ref 0–0.85)
MONOCYTES NFR BLD AUTO: 11.2 % (ref 0–13.4)
NEUTROPHILS # BLD AUTO: 7.54 K/UL (ref 2–7.15)
NEUTROPHILS NFR BLD: 55 % (ref 44–72)
NRBC # BLD AUTO: 0 K/UL
NRBC BLD-RTO: 0 /100 WBC
OPIATES UR QL SCN: POSITIVE
OXYCODONE UR QL SCN: NEGATIVE
PCP UR QL SCN: NEGATIVE
PLATELET # BLD AUTO: 356 K/UL (ref 164–446)
PMV BLD AUTO: 8.3 FL (ref 9–12.9)
POTASSIUM SERPL-SCNC: 4.3 MMOL/L (ref 3.6–5.5)
PROPOXYPH UR QL SCN: NEGATIVE
PROT SERPL-MCNC: 7.5 G/DL (ref 6–8.2)
RBC # BLD AUTO: 4.52 M/UL (ref 4.2–5.4)
SODIUM SERPL-SCNC: 136 MMOL/L (ref 135–145)
TROPONIN T SERPL-MCNC: 7 NG/L (ref 6–19)
WBC # BLD AUTO: 13.7 K/UL (ref 4.8–10.8)

## 2020-03-22 PROCEDURE — 700105 HCHG RX REV CODE 258: Performed by: EMERGENCY MEDICINE

## 2020-03-22 PROCEDURE — A9270 NON-COVERED ITEM OR SERVICE: HCPCS | Performed by: INTERNAL MEDICINE

## 2020-03-22 PROCEDURE — 96374 THER/PROPH/DIAG INJ IV PUSH: CPT | Mod: XU

## 2020-03-22 PROCEDURE — G0378 HOSPITAL OBSERVATION PER HR: HCPCS

## 2020-03-22 PROCEDURE — 700111 HCHG RX REV CODE 636 W/ 250 OVERRIDE (IP): Performed by: EMERGENCY MEDICINE

## 2020-03-22 PROCEDURE — 83690 ASSAY OF LIPASE: CPT

## 2020-03-22 PROCEDURE — 80053 COMPREHEN METABOLIC PANEL: CPT

## 2020-03-22 PROCEDURE — 99285 EMERGENCY DEPT VISIT HI MDM: CPT

## 2020-03-22 PROCEDURE — 700102 HCHG RX REV CODE 250 W/ 637 OVERRIDE(OP): Performed by: EMERGENCY MEDICINE

## 2020-03-22 PROCEDURE — 93005 ELECTROCARDIOGRAM TRACING: CPT | Performed by: EMERGENCY MEDICINE

## 2020-03-22 PROCEDURE — 99220 PR INITIAL OBSERVATION CARE,LEVL III: CPT | Mod: 25 | Performed by: INTERNAL MEDICINE

## 2020-03-22 PROCEDURE — 80307 DRUG TEST PRSMV CHEM ANLYZR: CPT

## 2020-03-22 PROCEDURE — 74175 CTA ABDOMEN W/CONTRAST: CPT

## 2020-03-22 PROCEDURE — 84484 ASSAY OF TROPONIN QUANT: CPT

## 2020-03-22 PROCEDURE — A9270 NON-COVERED ITEM OR SERVICE: HCPCS | Performed by: EMERGENCY MEDICINE

## 2020-03-22 PROCEDURE — 85025 COMPLETE CBC W/AUTO DIFF WBC: CPT

## 2020-03-22 PROCEDURE — 700102 HCHG RX REV CODE 250 W/ 637 OVERRIDE(OP): Performed by: INTERNAL MEDICINE

## 2020-03-22 PROCEDURE — 99407 BEHAV CHNG SMOKING > 10 MIN: CPT | Performed by: INTERNAL MEDICINE

## 2020-03-22 PROCEDURE — 700117 HCHG RX CONTRAST REV CODE 255: Performed by: EMERGENCY MEDICINE

## 2020-03-22 PROCEDURE — 700101 HCHG RX REV CODE 250: Performed by: EMERGENCY MEDICINE

## 2020-03-22 PROCEDURE — 700105 HCHG RX REV CODE 258: Performed by: INTERNAL MEDICINE

## 2020-03-22 PROCEDURE — 96375 TX/PRO/DX INJ NEW DRUG ADDON: CPT | Mod: XU

## 2020-03-22 PROCEDURE — 85379 FIBRIN DEGRADATION QUANT: CPT

## 2020-03-22 PROCEDURE — 84703 CHORIONIC GONADOTROPIN ASSAY: CPT

## 2020-03-22 PROCEDURE — 71045 X-RAY EXAM CHEST 1 VIEW: CPT

## 2020-03-22 RX ORDER — SODIUM CHLORIDE 9 MG/ML
INJECTION, SOLUTION INTRAVENOUS CONTINUOUS
Status: DISCONTINUED | OUTPATIENT
Start: 2020-03-22 | End: 2020-03-25 | Stop reason: HOSPADM

## 2020-03-22 RX ORDER — HYDROMORPHONE HYDROCHLORIDE 1 MG/ML
0.5 INJECTION, SOLUTION INTRAMUSCULAR; INTRAVENOUS; SUBCUTANEOUS
Status: DISCONTINUED | OUTPATIENT
Start: 2020-03-22 | End: 2020-03-25 | Stop reason: HOSPADM

## 2020-03-22 RX ORDER — POLYETHYLENE GLYCOL 3350 17 G/17G
1 POWDER, FOR SOLUTION ORAL
Status: DISCONTINUED | OUTPATIENT
Start: 2020-03-22 | End: 2020-03-25 | Stop reason: HOSPADM

## 2020-03-22 RX ORDER — AMOXICILLIN 250 MG
2 CAPSULE ORAL 2 TIMES DAILY
Status: DISCONTINUED | OUTPATIENT
Start: 2020-03-22 | End: 2020-03-25 | Stop reason: HOSPADM

## 2020-03-22 RX ORDER — CLINDAMYCIN HYDROCHLORIDE 150 MG/1
300 CAPSULE ORAL 3 TIMES DAILY
Status: DISCONTINUED | OUTPATIENT
Start: 2020-03-22 | End: 2020-03-25 | Stop reason: HOSPADM

## 2020-03-22 RX ORDER — OXYCODONE HYDROCHLORIDE 5 MG/1
5 TABLET ORAL
Status: DISCONTINUED | OUTPATIENT
Start: 2020-03-22 | End: 2020-03-25 | Stop reason: HOSPADM

## 2020-03-22 RX ORDER — ACETAMINOPHEN 325 MG/1
650 TABLET ORAL EVERY 6 HOURS PRN
Status: DISCONTINUED | OUTPATIENT
Start: 2020-03-22 | End: 2020-03-25 | Stop reason: HOSPADM

## 2020-03-22 RX ORDER — ONDANSETRON 2 MG/ML
4 INJECTION INTRAMUSCULAR; INTRAVENOUS EVERY 4 HOURS PRN
Status: DISCONTINUED | OUTPATIENT
Start: 2020-03-22 | End: 2020-03-25 | Stop reason: HOSPADM

## 2020-03-22 RX ORDER — LABETALOL HYDROCHLORIDE 5 MG/ML
10 INJECTION, SOLUTION INTRAVENOUS ONCE
Status: COMPLETED | OUTPATIENT
Start: 2020-03-22 | End: 2020-03-22

## 2020-03-22 RX ORDER — SODIUM CHLORIDE 9 MG/ML
1000 INJECTION, SOLUTION INTRAVENOUS ONCE
Status: COMPLETED | OUTPATIENT
Start: 2020-03-22 | End: 2020-03-22

## 2020-03-22 RX ORDER — PROCHLORPERAZINE EDISYLATE 5 MG/ML
5-10 INJECTION INTRAMUSCULAR; INTRAVENOUS EVERY 4 HOURS PRN
Status: DISCONTINUED | OUTPATIENT
Start: 2020-03-22 | End: 2020-03-25 | Stop reason: HOSPADM

## 2020-03-22 RX ORDER — ASPIRIN 81 MG/1
324 TABLET, CHEWABLE ORAL DAILY
Status: DISCONTINUED | OUTPATIENT
Start: 2020-03-23 | End: 2020-03-24

## 2020-03-22 RX ORDER — DIPHENHYDRAMINE HYDROCHLORIDE 50 MG/ML
25 INJECTION INTRAMUSCULAR; INTRAVENOUS ONCE
Status: COMPLETED | OUTPATIENT
Start: 2020-03-22 | End: 2020-03-22

## 2020-03-22 RX ORDER — PROMETHAZINE HYDROCHLORIDE 25 MG/1
12.5-25 TABLET ORAL EVERY 4 HOURS PRN
Status: DISCONTINUED | OUTPATIENT
Start: 2020-03-22 | End: 2020-03-25 | Stop reason: HOSPADM

## 2020-03-22 RX ORDER — BISACODYL 10 MG
10 SUPPOSITORY, RECTAL RECTAL
Status: DISCONTINUED | OUTPATIENT
Start: 2020-03-22 | End: 2020-03-25 | Stop reason: HOSPADM

## 2020-03-22 RX ORDER — ONDANSETRON 4 MG/1
4 TABLET, ORALLY DISINTEGRATING ORAL EVERY 4 HOURS PRN
Status: DISCONTINUED | OUTPATIENT
Start: 2020-03-22 | End: 2020-03-25 | Stop reason: HOSPADM

## 2020-03-22 RX ORDER — LISINOPRIL 10 MG/1
10 TABLET ORAL DAILY
Status: DISCONTINUED | OUTPATIENT
Start: 2020-03-23 | End: 2020-03-25 | Stop reason: HOSPADM

## 2020-03-22 RX ORDER — HYDROXYZINE HYDROCHLORIDE 10 MG/1
10 TABLET, FILM COATED ORAL 3 TIMES DAILY PRN
Status: DISCONTINUED | OUTPATIENT
Start: 2020-03-22 | End: 2020-03-25 | Stop reason: HOSPADM

## 2020-03-22 RX ORDER — NITROGLYCERIN 0.4 MG/1
0.4 TABLET SUBLINGUAL
Status: DISCONTINUED | OUTPATIENT
Start: 2020-03-22 | End: 2020-03-25 | Stop reason: HOSPADM

## 2020-03-22 RX ORDER — IBUPROFEN 800 MG/1
800 TABLET ORAL EVERY 8 HOURS PRN
Status: DISCONTINUED | OUTPATIENT
Start: 2020-03-22 | End: 2020-03-25 | Stop reason: HOSPADM

## 2020-03-22 RX ORDER — ASPIRIN 300 MG/1
300 SUPPOSITORY RECTAL DAILY
Status: DISCONTINUED | OUTPATIENT
Start: 2020-03-23 | End: 2020-03-24

## 2020-03-22 RX ORDER — ENALAPRILAT 1.25 MG/ML
1.25 INJECTION INTRAVENOUS EVERY 6 HOURS PRN
Status: DISCONTINUED | OUTPATIENT
Start: 2020-03-22 | End: 2020-03-25 | Stop reason: HOSPADM

## 2020-03-22 RX ORDER — CLINDAMYCIN HYDROCHLORIDE 150 MG/1
300 CAPSULE ORAL 3 TIMES DAILY
Status: ON HOLD | COMMUNITY
End: 2020-03-25

## 2020-03-22 RX ORDER — IBUPROFEN 800 MG/1
800 TABLET ORAL EVERY 8 HOURS PRN
Status: ON HOLD | COMMUNITY
End: 2020-03-25

## 2020-03-22 RX ORDER — ONDANSETRON 2 MG/ML
4 INJECTION INTRAMUSCULAR; INTRAVENOUS ONCE
Status: COMPLETED | OUTPATIENT
Start: 2020-03-22 | End: 2020-03-22

## 2020-03-22 RX ORDER — OXYCODONE HYDROCHLORIDE 10 MG/1
10 TABLET ORAL
Status: DISCONTINUED | OUTPATIENT
Start: 2020-03-22 | End: 2020-03-25 | Stop reason: HOSPADM

## 2020-03-22 RX ORDER — MORPHINE SULFATE 4 MG/ML
4 INJECTION, SOLUTION INTRAMUSCULAR; INTRAVENOUS ONCE
Status: COMPLETED | OUTPATIENT
Start: 2020-03-22 | End: 2020-03-22

## 2020-03-22 RX ORDER — HYDROCODONE BITARTRATE AND ACETAMINOPHEN 7.5; 325 MG/1; MG/1
1 TABLET ORAL EVERY 4 HOURS PRN
Status: ON HOLD | COMMUNITY
End: 2020-03-25

## 2020-03-22 RX ORDER — PROMETHAZINE HYDROCHLORIDE 25 MG/1
12.5-25 SUPPOSITORY RECTAL EVERY 4 HOURS PRN
Status: DISCONTINUED | OUTPATIENT
Start: 2020-03-22 | End: 2020-03-25 | Stop reason: HOSPADM

## 2020-03-22 RX ORDER — ASPIRIN 325 MG
325 TABLET ORAL DAILY
Status: DISCONTINUED | OUTPATIENT
Start: 2020-03-23 | End: 2020-03-24

## 2020-03-22 RX ADMIN — SODIUM CHLORIDE 1000 ML: 9 INJECTION, SOLUTION INTRAVENOUS at 20:22

## 2020-03-22 RX ADMIN — NITROGLYCERIN 0.4 MG: 0.4 TABLET, ORALLY DISINTEGRATING SUBLINGUAL at 17:10

## 2020-03-22 RX ADMIN — ONDANSETRON 4 MG: 2 INJECTION INTRAMUSCULAR; INTRAVENOUS at 17:30

## 2020-03-22 RX ADMIN — MORPHINE SULFATE 4 MG: 4 INJECTION INTRAVENOUS at 17:30

## 2020-03-22 RX ADMIN — CLINDAMYCIN HYDROCHLORIDE 300 MG: 150 CAPSULE ORAL at 22:08

## 2020-03-22 RX ADMIN — SODIUM CHLORIDE: 9 INJECTION, SOLUTION INTRAVENOUS at 23:44

## 2020-03-22 RX ADMIN — NITROGLYCERIN 0.4 MG: 0.4 TABLET, ORALLY DISINTEGRATING SUBLINGUAL at 17:30

## 2020-03-22 RX ADMIN — NITROGLYCERIN 0.4 MG: 0.4 TABLET, ORALLY DISINTEGRATING SUBLINGUAL at 17:20

## 2020-03-22 RX ADMIN — SENNOSIDES AND DOCUSATE SODIUM 2 TABLET: 8.6; 5 TABLET ORAL at 22:08

## 2020-03-22 RX ADMIN — LABETALOL HYDROCHLORIDE 10 MG: 5 INJECTION, SOLUTION INTRAVENOUS at 18:05

## 2020-03-22 RX ADMIN — DIPHENHYDRAMINE HYDROCHLORIDE 25 MG: 50 INJECTION, SOLUTION INTRAMUSCULAR; INTRAVENOUS at 19:32

## 2020-03-22 RX ADMIN — IOHEXOL 100 ML: 350 INJECTION, SOLUTION INTRAVENOUS at 19:49

## 2020-03-22 RX ADMIN — OXYCODONE HYDROCHLORIDE 10 MG: 10 TABLET ORAL at 23:45

## 2020-03-22 RX ADMIN — LIDOCAINE HYDROCHLORIDE 15 ML: 20 SOLUTION OROPHARYNGEAL at 16:13

## 2020-03-22 ASSESSMENT — ENCOUNTER SYMPTOMS
DIZZINESS: 0
NAUSEA: 0
FEVER: 0
TINGLING: 0
SHORTNESS OF BREATH: 0
LOSS OF CONSCIOUSNESS: 0
PALPITATIONS: 0
ABDOMINAL PAIN: 0
DEPRESSION: 0
CONSTIPATION: 0
SPUTUM PRODUCTION: 0
COUGH: 0
FALLS: 0
NERVOUS/ANXIOUS: 1
VOMITING: 0
MYALGIAS: 0
CHILLS: 0
DIARRHEA: 0
WEAKNESS: 0
HEADACHES: 0
STRIDOR: 0

## 2020-03-22 ASSESSMENT — FIBROSIS 4 INDEX
FIB4 SCORE: 0.49
FIB4 SCORE: 0.37

## 2020-03-22 NOTE — ED TRIAGE NOTES
Here for chest pain and radiation into right arm. Parasthesia right arm. Recent oral surgery. Hx HTN. 100mcg fent in route and 324 ASA

## 2020-03-22 NOTE — ED NOTES
Med rec complete per patient at bedside and verified with Pharmacy.  Allergies verified.    Narx Check complete.    [facility]
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
## 2020-03-22 NOTE — ED NOTES
"Patient states \"you all aren't helping me something is wrong and I want a new nurse and doctor\"  "

## 2020-03-23 ENCOUNTER — APPOINTMENT (OUTPATIENT)
Dept: CARDIOLOGY | Facility: MEDICAL CENTER | Age: 40
End: 2020-03-23
Attending: INTERNAL MEDICINE
Payer: MEDICAID

## 2020-03-23 ENCOUNTER — DOCUMENTATION (OUTPATIENT)
Dept: HEALTH INFORMATION MANAGEMENT | Facility: OTHER | Age: 40
End: 2020-03-23

## 2020-03-23 LAB
ANION GAP SERPL CALC-SCNC: 12 MMOL/L (ref 7–16)
BUN SERPL-MCNC: 20 MG/DL (ref 8–22)
CALCIUM SERPL-MCNC: 8.7 MG/DL (ref 8.5–10.5)
CHLORIDE SERPL-SCNC: 107 MMOL/L (ref 96–112)
CO2 SERPL-SCNC: 18 MMOL/L (ref 20–33)
CREAT SERPL-MCNC: 0.9 MG/DL (ref 0.5–1.4)
EKG IMPRESSION: NORMAL
ERYTHROCYTE [DISTWIDTH] IN BLOOD BY AUTOMATED COUNT: 43.8 FL (ref 35.9–50)
GLUCOSE SERPL-MCNC: 80 MG/DL (ref 65–99)
HCT VFR BLD AUTO: 38.6 % (ref 37–47)
HGB BLD-MCNC: 13.4 G/DL (ref 12–16)
LV EJECT FRACT  99904: 60
LV EJECT FRACT MOD 2C 99903: 62.94
LV EJECT FRACT MOD 4C 99902: 60.56
LV EJECT FRACT MOD BP 99901: 62.49
MCH RBC QN AUTO: 32.2 PG (ref 27–33)
MCHC RBC AUTO-ENTMCNC: 34.7 G/DL (ref 33.6–35)
MCV RBC AUTO: 92.8 FL (ref 81.4–97.8)
PLATELET # BLD AUTO: 300 K/UL (ref 164–446)
PMV BLD AUTO: 8.1 FL (ref 9–12.9)
POTASSIUM SERPL-SCNC: 4.4 MMOL/L (ref 3.6–5.5)
RBC # BLD AUTO: 4.16 M/UL (ref 4.2–5.4)
SODIUM SERPL-SCNC: 137 MMOL/L (ref 135–145)
TROPONIN T SERPL-MCNC: <6 NG/L (ref 6–19)
TROPONIN T SERPL-MCNC: <6 NG/L (ref 6–19)
WBC # BLD AUTO: 10.5 K/UL (ref 4.8–10.8)

## 2020-03-23 PROCEDURE — 93306 TTE W/DOPPLER COMPLETE: CPT | Mod: 26 | Performed by: INTERNAL MEDICINE

## 2020-03-23 PROCEDURE — 99226 PR SUBSEQUENT OBSERVATION CARE,LEVEL III: CPT | Performed by: INTERNAL MEDICINE

## 2020-03-23 PROCEDURE — 700102 HCHG RX REV CODE 250 W/ 637 OVERRIDE(OP): Performed by: HOSPITALIST

## 2020-03-23 PROCEDURE — 700102 HCHG RX REV CODE 250 W/ 637 OVERRIDE(OP): Performed by: INTERNAL MEDICINE

## 2020-03-23 PROCEDURE — 93306 TTE W/DOPPLER COMPLETE: CPT

## 2020-03-23 PROCEDURE — 76705 ECHO EXAM OF ABDOMEN: CPT

## 2020-03-23 PROCEDURE — G0378 HOSPITAL OBSERVATION PER HR: HCPCS

## 2020-03-23 PROCEDURE — A9270 NON-COVERED ITEM OR SERVICE: HCPCS | Performed by: HOSPITALIST

## 2020-03-23 PROCEDURE — 36415 COLL VENOUS BLD VENIPUNCTURE: CPT

## 2020-03-23 PROCEDURE — 85027 COMPLETE CBC AUTOMATED: CPT

## 2020-03-23 PROCEDURE — 93005 ELECTROCARDIOGRAM TRACING: CPT | Performed by: HOSPITALIST

## 2020-03-23 PROCEDURE — 700105 HCHG RX REV CODE 258: Performed by: INTERNAL MEDICINE

## 2020-03-23 PROCEDURE — 96372 THER/PROPH/DIAG INJ SC/IM: CPT

## 2020-03-23 PROCEDURE — 93010 ELECTROCARDIOGRAM REPORT: CPT | Performed by: INTERNAL MEDICINE

## 2020-03-23 PROCEDURE — 96376 TX/PRO/DX INJ SAME DRUG ADON: CPT | Mod: XU

## 2020-03-23 PROCEDURE — 96375 TX/PRO/DX INJ NEW DRUG ADDON: CPT | Mod: XU

## 2020-03-23 PROCEDURE — 93005 ELECTROCARDIOGRAM TRACING: CPT | Performed by: INTERNAL MEDICINE

## 2020-03-23 PROCEDURE — 80048 BASIC METABOLIC PNL TOTAL CA: CPT

## 2020-03-23 PROCEDURE — A9270 NON-COVERED ITEM OR SERVICE: HCPCS | Performed by: INTERNAL MEDICINE

## 2020-03-23 PROCEDURE — 700111 HCHG RX REV CODE 636 W/ 250 OVERRIDE (IP): Performed by: INTERNAL MEDICINE

## 2020-03-23 PROCEDURE — 84484 ASSAY OF TROPONIN QUANT: CPT

## 2020-03-23 RX ORDER — ACETAMINOPHEN 500 MG
1000 TABLET ORAL EVERY 8 HOURS PRN
Status: DISCONTINUED | OUTPATIENT
Start: 2020-03-23 | End: 2020-03-25 | Stop reason: HOSPADM

## 2020-03-23 RX ORDER — ALPRAZOLAM 0.25 MG/1
0.25 TABLET ORAL 2 TIMES DAILY PRN
Status: COMPLETED | OUTPATIENT
Start: 2020-03-23 | End: 2020-03-24

## 2020-03-23 RX ORDER — OMEPRAZOLE 20 MG/1
20 CAPSULE, DELAYED RELEASE ORAL DAILY
Status: DISCONTINUED | OUTPATIENT
Start: 2020-03-23 | End: 2020-03-25 | Stop reason: HOSPADM

## 2020-03-23 RX ORDER — ALPRAZOLAM 1 MG/1
1 TABLET ORAL ONCE
Status: COMPLETED | OUTPATIENT
Start: 2020-03-23 | End: 2020-03-23

## 2020-03-23 RX ADMIN — ALPRAZOLAM 0.25 MG: 0.25 TABLET ORAL at 14:46

## 2020-03-23 RX ADMIN — SODIUM CHLORIDE: 9 INJECTION, SOLUTION INTRAVENOUS at 09:53

## 2020-03-23 RX ADMIN — ENOXAPARIN SODIUM 40 MG: 100 INJECTION SUBCUTANEOUS at 05:17

## 2020-03-23 RX ADMIN — ALPRAZOLAM 1 MG: 1 TABLET ORAL at 23:30

## 2020-03-23 RX ADMIN — HYDROMORPHONE HYDROCHLORIDE 0.5 MG: 1 INJECTION, SOLUTION INTRAMUSCULAR; INTRAVENOUS; SUBCUTANEOUS at 23:01

## 2020-03-23 RX ADMIN — LISINOPRIL 10 MG: 10 TABLET ORAL at 05:16

## 2020-03-23 RX ADMIN — HYDROXYZINE HYDROCHLORIDE 10 MG: 10 TABLET, FILM COATED ORAL at 03:45

## 2020-03-23 RX ADMIN — CLINDAMYCIN HYDROCHLORIDE 300 MG: 150 CAPSULE ORAL at 14:46

## 2020-03-23 RX ADMIN — OXYCODONE HYDROCHLORIDE 10 MG: 10 TABLET ORAL at 20:50

## 2020-03-23 RX ADMIN — HYDROMORPHONE HYDROCHLORIDE 0.5 MG: 1 INJECTION, SOLUTION INTRAMUSCULAR; INTRAVENOUS; SUBCUTANEOUS at 02:42

## 2020-03-23 RX ADMIN — IBUPROFEN 800 MG: 800 TABLET, FILM COATED ORAL at 05:18

## 2020-03-23 RX ADMIN — HYDROXYZINE HYDROCHLORIDE 10 MG: 10 TABLET, FILM COATED ORAL at 11:14

## 2020-03-23 RX ADMIN — CLINDAMYCIN HYDROCHLORIDE 300 MG: 150 CAPSULE ORAL at 20:55

## 2020-03-23 RX ADMIN — ASPIRIN 325 MG: 325 TABLET, FILM COATED ORAL at 05:16

## 2020-03-23 RX ADMIN — OXYCODONE HYDROCHLORIDE 10 MG: 10 TABLET ORAL at 16:05

## 2020-03-23 RX ADMIN — OXYCODONE HYDROCHLORIDE 10 MG: 10 TABLET ORAL at 08:27

## 2020-03-23 RX ADMIN — SODIUM CHLORIDE: 9 INJECTION, SOLUTION INTRAVENOUS at 20:50

## 2020-03-23 RX ADMIN — ACETAMINOPHEN 1000 MG: 500 TABLET, FILM COATED ORAL at 12:02

## 2020-03-23 RX ADMIN — OMEPRAZOLE 20 MG: 20 CAPSULE, DELAYED RELEASE ORAL at 12:02

## 2020-03-23 RX ADMIN — LIDOCAINE HYDROCHLORIDE 15 ML: 20 SOLUTION OROPHARYNGEAL at 03:09

## 2020-03-23 RX ADMIN — HYDROMORPHONE HYDROCHLORIDE 0.5 MG: 1 INJECTION, SOLUTION INTRAMUSCULAR; INTRAVENOUS; SUBCUTANEOUS at 09:54

## 2020-03-23 RX ADMIN — LIDOCAINE HYDROCHLORIDE 15 ML: 20 SOLUTION OROPHARYNGEAL at 18:49

## 2020-03-23 RX ADMIN — CLINDAMYCIN HYDROCHLORIDE 300 MG: 150 CAPSULE ORAL at 08:16

## 2020-03-23 ASSESSMENT — ENCOUNTER SYMPTOMS
HEADACHES: 0
MYALGIAS: 0
SPEECH CHANGE: 0
SHORTNESS OF BREATH: 0
VOMITING: 0
WEIGHT LOSS: 0
TREMORS: 0
PHOTOPHOBIA: 0
SENSORY CHANGE: 0
FOCAL WEAKNESS: 0
DOUBLE VISION: 0
NAUSEA: 0
EYE PAIN: 0
FEVER: 0
CHILLS: 0
PALPITATIONS: 0
BLURRED VISION: 0
DIZZINESS: 0
COUGH: 0
ABDOMINAL PAIN: 0
ORTHOPNEA: 0
NECK PAIN: 0
TINGLING: 0
SPUTUM PRODUCTION: 0
BACK PAIN: 0
HALLUCINATIONS: 0
NERVOUS/ANXIOUS: 1
DIARRHEA: 0
CONSTIPATION: 0

## 2020-03-23 ASSESSMENT — LIFESTYLE VARIABLES
EVER HAD A DRINK FIRST THING IN THE MORNING TO STEADY YOUR NERVES TO GET RID OF A HANGOVER: NO
EVER_SMOKED: YES
HAVE PEOPLE ANNOYED YOU BY CRITICIZING YOUR DRINKING: NO
AVERAGE NUMBER OF DAYS PER WEEK YOU HAVE A DRINK CONTAINING ALCOHOL: 0
DOES PATIENT WANT TO STOP DRINKING: CANNOT ASSESS
ON A TYPICAL DAY WHEN YOU DRINK ALCOHOL HOW MANY DRINKS DO YOU HAVE: 0
ALCOHOL_USE: NO
TOTAL SCORE: 0
TOTAL SCORE: 0
HAVE YOU EVER FELT YOU SHOULD CUT DOWN ON YOUR DRINKING: NO
EVER FELT BAD OR GUILTY ABOUT YOUR DRINKING: NO
HOW MANY TIMES IN THE PAST YEAR HAVE YOU HAD 5 OR MORE DRINKS IN A DAY: 0
SUBSTANCE_ABUSE: 0
CONSUMPTION TOTAL: NEGATIVE
TOTAL SCORE: 0

## 2020-03-23 ASSESSMENT — COGNITIVE AND FUNCTIONAL STATUS - GENERAL
SUGGESTED CMS G CODE MODIFIER MOBILITY: CH
SUGGESTED CMS G CODE MODIFIER DAILY ACTIVITY: CH
DAILY ACTIVITIY SCORE: 24
MOBILITY SCORE: 24

## 2020-03-23 ASSESSMENT — PATIENT HEALTH QUESTIONNAIRE - PHQ9
2. FEELING DOWN, DEPRESSED, IRRITABLE, OR HOPELESS: NOT AT ALL
SUM OF ALL RESPONSES TO PHQ9 QUESTIONS 1 AND 2: 0
1. LITTLE INTEREST OR PLEASURE IN DOING THINGS: NOT AT ALL

## 2020-03-23 ASSESSMENT — COPD QUESTIONNAIRES
DO YOU EVER COUGH UP ANY MUCUS OR PHLEGM?: NO/ONLY WITH OCCASIONAL COLDS OR INFECTIONS
DURING THE PAST 4 WEEKS HOW MUCH DID YOU FEEL SHORT OF BREATH: NONE/LITTLE OF THE TIME
HAVE YOU SMOKED AT LEAST 100 CIGARETTES IN YOUR ENTIRE LIFE: NO/DON'T KNOW

## 2020-03-23 NOTE — H&P
Hospital Medicine History & Physical Note    Date of Service  3/22/2020    Primary Care Physician  Pcp Pt States None    Consultants  None    Code Status  Full    Chief Complaint  Chest pain    History of Presenting Illness  39 y.o. female who presented 3/22/2020 with chest pain.  Patient states she went to bed in her usual state of health, woke up at 4 in the morning with chest pain.  She states the pain was severe enough to wake her up.  She states she took her blood pressure med and ibuprofen and was able to go back to sleep.  She states she woke up again at 1130 with pain.  He states the pain continued and she eventually had a panic attack.  Described the pain as substernal, ripping, 10/10 at its worse.  She recently did have teeth extracted, 15 I believe, 2 days ago and has been on clindamycin.  She states she does have something to eat when she takes her clindamycin.  She denied any nausea or vomiting.  I did discuss case including labs and imaging with the ER physician.    Review of Systems  Review of Systems   Constitutional: Negative for chills, fever and malaise/fatigue.   HENT: Negative for congestion.    Respiratory: Negative for cough, sputum production, shortness of breath and stridor.    Cardiovascular: Positive for chest pain. Negative for palpitations and leg swelling.   Gastrointestinal: Negative for abdominal pain, constipation, diarrhea, nausea and vomiting.   Genitourinary: Negative for dysuria and urgency.   Musculoskeletal: Negative for falls and myalgias.   Neurological: Negative for dizziness, tingling, loss of consciousness, weakness and headaches.   Psychiatric/Behavioral: Negative for depression and suicidal ideas. The patient is nervous/anxious.    All other systems reviewed and are negative.      Past Medical History   has a past medical history of Abdominal pain (1/13/2011), Endometriosis, Hydronephrosis, right (1/13/2011), Kidney stones, Ovarian torsion, Psychiatric problem, Renal  calculi (1/13/2011), and Right flank pain (1/13/2011).    Surgical History   has a past surgical history that includes other abdominal surgery and gyn surgery.     Family History  Stroke, CHF    Social History   reports that she has been smoking. She has never used smokeless tobacco. She reports that she does not drink alcohol or use drugs.    Allergies  Allergies   Allergen Reactions   • Ativan [Lorazepam]      Pt reports that it makes her hallucinate    • Droperidol Hives and Shortness of Breath   • Iodine Rash   • Penicillins Hives   • Phenergan [Promethazine Hcl] Hives   • Reglan [Metoclopramide Hcl] Hives   • Rocephin [Ceftriaxone Sodium] Hives   • Toradol Vomiting and Nausea       Medications  Prior to Admission Medications   Prescriptions Last Dose Informant Patient Reported? Taking?   HYDROcodone-acetaminophen (NORCO) 7.5-325 MG per tablet 3/22/2020 at 0400  Yes Yes   Sig: Take 1 Tab by mouth every four hours as needed for Moderate Pain.   clindamycin (CLEOCIN) 150 MG Cap 3/22/2020 at 0400  Yes Yes   Sig: Take 300 mg by mouth 3 times a day.   ibuprofen (MOTRIN) 800 MG Tab 3/22/2020 at 0400  Yes Yes   Sig: Take 800 mg by mouth every 8 hours as needed for Moderate Pain.   lisinopril (PRINIVIL) 10 MG Tab 3/22/2020 at 0400  No No   Sig: Take 1 Tab by mouth every day.      Facility-Administered Medications: None       Physical Exam  Temp:  [36.7 °C (98.1 °F)] 36.7 °C (98.1 °F)  Pulse:  [64-87] 64  Resp:  [15-24] 17  BP: (151-191)/() 166/72  SpO2:  [96 %-100 %] 99 %    Physical Exam  Vitals signs and nursing note reviewed.   Constitutional:       General: She is not in acute distress.     Appearance: She is well-developed. She is not diaphoretic.   HENT:      Head: Normocephalic and atraumatic.      Right Ear: External ear normal.      Left Ear: External ear normal.      Nose: Nose normal. No congestion or rhinorrhea.      Mouth/Throat:      Mouth: Mucous membranes are dry.      Pharynx: No oropharyngeal  exudate.   Eyes:      General: No scleral icterus.        Right eye: No discharge.         Left eye: No discharge.      Extraocular Movements: Extraocular movements intact.   Neck:      Musculoskeletal: Neck supple.      Trachea: No tracheal deviation.   Cardiovascular:      Rate and Rhythm: Normal rate and regular rhythm.      Heart sounds: No murmur. No friction rub. No gallop.    Pulmonary:      Effort: Pulmonary effort is normal. No respiratory distress.      Breath sounds: Normal breath sounds. No stridor. No wheezing or rales.   Chest:      Chest wall: Tenderness present.   Abdominal:      General: Bowel sounds are normal. There is no distension.      Palpations: Abdomen is soft.      Tenderness: There is abdominal tenderness in the epigastric area.   Musculoskeletal: Normal range of motion.         General: No tenderness.      Right lower leg: No edema.      Left lower leg: No edema.   Lymphadenopathy:      Cervical: No cervical adenopathy.   Skin:     General: Skin is warm and dry.      Coloration: Skin is not jaundiced.      Findings: No erythema or rash.   Neurological:      General: No focal deficit present.      Mental Status: She is alert and oriented to person, place, and time.      Cranial Nerves: No cranial nerve deficit.   Psychiatric:         Mood and Affect: Mood is anxious.         Behavior: Behavior normal.         Thought Content: Thought content normal.         Judgment: Judgment normal.         Laboratory:  Recent Labs     03/22/20  1544   WBC 13.7*   RBC 4.52   HEMOGLOBIN 14.4   HEMATOCRIT 41.9   MCV 92.7   MCH 31.9   MCHC 34.4   RDW 43.8   PLATELETCT 356   MPV 8.3*     Recent Labs     03/22/20  1544   SODIUM 136   POTASSIUM 4.3   CHLORIDE 105   CO2 18*   GLUCOSE 82   BUN 30*   CREATININE 1.15   CALCIUM 9.4     Recent Labs     03/22/20  1544   ALTSGPT 12   ASTSGOT 15   ALKPHOSPHAT 67   TBILIRUBIN 0.3   LIPASE 87*   GLUCOSE 82         No results for input(s): NTPROBNP in the last 72 hours.       Recent Labs     03/22/20  1544   TROPONINT 7       Urinalysis:    No results found     Imaging:  CT-CTA COMPLETE THORACOABDOMINAL AORTA   Final Result      1.  Negative for aortic aneurysm or dissection      2.  Overall, no acute process      3.  Hepatic steatosis and hepatomegaly      4.  Incidental left renal cyst      No follow up imaging is recommended per consensus guidelines of the 2019 ACR Incidental Findings Committee for probably benign incidental simple appearing renal cystic lesion(s) based on imaging criteria.            DX-CHEST-PORTABLE (1 VIEW)   Final Result      No evidence of acute cardiopulmonary process.            Assessment/Plan:  I anticipate this patient is appropriate for observation status at this time.    * Chest pain- (present on admission)  Assessment & Plan  -Patient does have a family history of CHF but she denies any family history of MI  -Does have hypertension but no additional risk factors  -She is low risk, I feel like a stress test would have a high false positive probability considering she is only 39 and obese, it is not warranted at this point in time  -Trend troponins  -Chest pain is somewhat reproducible on exam as well  -She also is somewhat anxious  -I did personally review her EKG, noted sinus rhythm with T wave inversions in lead III and only lead III  -Repeat EKG  -Monitor on telemetry  -If troponins do elevate or there is a change in EKG, consider additional work-up  -Pain is possibly due to pancreatitis    Leucocytosis- (present on admission)  Assessment & Plan  -Likely reactive patient with mild pancreatitis  -No antibiotics warranted at this time  -Repeat CBC in the morning    RADHA (acute kidney injury) (HCC)- (present on admission)  Assessment & Plan  -Due to dehydration  -Causing mild acidosis, non-gap  -Start IV fluids  -Repeat BMP in the morning    Pancreatitis- (present on admission)  Assessment & Plan  -Mild elevation in lipase, gastric pain is present on  palpation  -No change noted on CT scan  -Start patient on clear liquid diet and IV fluids  -If improvement in her pain, consider advancing diet  -Obtain right upper quadrant ultrasound    Tobacco abuse- (present on admission)  Assessment & Plan  -Tobacco cessation counseling and education provided for more than 11 minutes. Nicotine replacement options provided including patch, and further medical treatments including Wellbutrin and chantix.  As well as over the counter options of lozenges and gum  -She denied the need for a patch    HTN (hypertension)- (present on admission)  Assessment & Plan  -Continue home lisinopril  -Start PRN enalapril  -Adjust as needed      VTE prophylaxis: Lovenox

## 2020-03-23 NOTE — PROGRESS NOTES
[facility] Medicine Daily Progress Note    Date of Service  3/23/2020    Chief Complaint  39 y.o. female admitted 3/22/2020 with chest pain    Hospital Course    *39-year-old female presented to the hospital on March 22, 2020 with complaint of chest pain.  She underwent CTA thoracoabdominal aorta did not show any acute or maladies.  She also underwent CT abdomen and pelvis without contrast and did not show any acute abnormalities.  I ordered echocardiogram.  Her troponin is negative x3.*      Interval Problem Update  I ordered and examined her at the bedside.  She expressed that she is feeling pressure-like sensation in her chest.  Her symptoms of chest pain has been improving.  I discussed findings of radiological studies with her and updated her that her troponin is normal.  I ordered echocardiogram.  I ordered omeprazole.  She expressed that she feels anxious and I started her on Xanax 0.25 mg twice daily total of 2 doses    Consultants/Specialty  None    Code Status  Full code    Disposition  Home when medically stable    Review of Systems  Review of Systems   Constitutional: Negative for chills, fever and weight loss.   HENT: Negative for hearing loss and tinnitus.    Eyes: Negative for blurred vision, double vision, photophobia and pain.   Respiratory: Negative for cough, sputum production and shortness of breath.    Cardiovascular: Positive for chest pain. Negative for palpitations, orthopnea and leg swelling.   Gastrointestinal: Negative for abdominal pain, constipation, diarrhea, nausea and vomiting.   Genitourinary: Negative for dysuria, frequency and urgency.   Musculoskeletal: Negative for back pain, joint pain, myalgias and neck pain.   Skin: Negative for rash.   Neurological: Negative for dizziness, tingling, tremors, sensory change, speech change, focal weakness and headaches.   Psychiatric/Behavioral: Negative for hallucinations and substance abuse. The patient is nervous/anxious.    All other systems  reviewed and are negative.       Physical Exam  Temp:  [36.5 °C (97.7 °F)-36.7 °C (98.1 °F)] 36.6 °C (97.8 °F)  Pulse:  [64-87] 66  Resp:  [15-24] 18  BP: (117-191)/() 131/78  SpO2:  [95 %-100 %] 95 %    Physical Exam  Vitals signs reviewed.   Constitutional:       General: She is not in acute distress.     Appearance: Normal appearance. She is not ill-appearing.   HENT:      Head: Normocephalic and atraumatic.      Nose: No congestion.   Eyes:      General:         Right eye: No discharge.         Left eye: No discharge.      Pupils: Pupils are equal, round, and reactive to light.   Neck:      Musculoskeletal: Normal range of motion. No neck rigidity.   Cardiovascular:      Rate and Rhythm: Normal rate and regular rhythm.      Pulses: Normal pulses.      Heart sounds: Normal heart sounds. No murmur.   Pulmonary:      Effort: Pulmonary effort is normal. No respiratory distress.      Breath sounds: Normal breath sounds. No stridor.   Chest:      Chest wall: Tenderness present.   Abdominal:      General: Bowel sounds are normal. There is no distension.      Palpations: Abdomen is soft.      Tenderness: There is no abdominal tenderness.   Musculoskeletal: Normal range of motion.         General: No swelling or tenderness.   Skin:     General: Skin is warm.      Capillary Refill: Capillary refill takes less than 2 seconds.      Coloration: Skin is not jaundiced or pale.      Findings: No bruising.   Neurological:      General: No focal deficit present.      Mental Status: She is alert and oriented to person, place, and time.      Cranial Nerves: No cranial nerve deficit.   Psychiatric:         Mood and Affect: Mood is anxious.         Behavior: Behavior normal.         Fluids    Intake/Output Summary (Last 24 hours) at 3/23/2020 1448  Last data filed at 3/23/2020 0827  Gross per 24 hour   Intake 2071.67 ml   Output 350 ml   Net 1721.67 ml       Laboratory  Recent Labs     03/22/20  1544 03/23/20  0648   WBC 13.7*  10.5   RBC 4.52 4.16*   HEMOGLOBIN 14.4 13.4   HEMATOCRIT 41.9 38.6   MCV 92.7 92.8   MCH 31.9 32.2   MCHC 34.4 34.7   RDW 43.8 43.8   PLATELETCT 356 300   MPV 8.3* 8.1*     Recent Labs     03/22/20  1544 03/23/20  0230   SODIUM 136 137   POTASSIUM 4.3 4.4   CHLORIDE 105 107   CO2 18* 18*   GLUCOSE 82 80   BUN 30* 20   CREATININE 1.15 0.90   CALCIUM 9.4 8.7                   Imaging  EC-ECHOCARDIOGRAM COMPLETE W/O CONT         US-RUQ   Final Result      Negative gallbladder/right upper quadrant ultrasound      CT-CTA COMPLETE THORACOABDOMINAL AORTA   Final Result      1.  Negative for aortic aneurysm or dissection      2.  Overall, no acute process      3.  Hepatic steatosis and hepatomegaly      4.  Incidental left renal cyst      No follow up imaging is recommended per consensus guidelines of the 2019 ACR Incidental Findings Committee for probably benign incidental simple appearing renal cystic lesion(s) based on imaging criteria.            DX-CHEST-PORTABLE (1 VIEW)   Final Result      No evidence of acute cardiopulmonary process.           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  Troponin negative x3.  I ordered echocardiogram to evaluate for any wall motion abnormalities.  Patient expressed that she feels very anxious and I ordered Xanax 0.25 mg x 2 with holding parameters.  Her d-dimer is negative.  CTA thoracoabdominal aorta did not show any acute abnormalities  She found to have minimally elevated lipase which does not fulfill the criteria for pancreatitis    Leucocytosis- (present on admission)  Assessment & Plan  Most likely reactive to  She remains afebrile    RADHA (acute kidney injury) (HCC)- (present on admission)  Assessment & Plan  Due to  dehydration.  Renal function has been improving.  Continue to monitor.      Pancreatitis- (present on admission)  Assessment & Plan  She has minimal elevation of lipase per  CT scan did not show any acute abnormalities  Ultrasound right upper quadrant did not show any acute abnormalities.      Tobacco abuse- (present on admission)  Assessment & Plan  Counseling provided by admitting physician    HTN (hypertension)- (present on admission)  Assessment & Plan  Continue outpatient lisinopril  Continue to monitor     I discussed plan of care during multidisciplinary rounds.    VTE prophylaxis: Lovenox

## 2020-03-23 NOTE — ED NOTES
Patient removing monitoring devices, pulse ox, 02, cardiac leads, bp cuff. Security still at bedside while [facility] ERP is in room for safety.

## 2020-03-23 NOTE — CARE PLAN
Problem: Infection  Goal: Will remain free from infection  Outcome: PROGRESSING AS EXPECTED     Problem: Knowledge Deficit  Goal: Knowledge of disease process/condition, treatment plan, diagnostic tests, and medications will improve  Outcome: PROGRESSING AS EXPECTED     Problem: Pain Management  Goal: Pain level will decrease to patient's comfort goal  Outcome: PROGRESSING AS EXPECTED

## 2020-03-23 NOTE — ASSESSMENT & PLAN NOTE
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  Troponin negative x3.  I ordered echocardiogram to evaluate for any wall motion abnormalities.  Patient expressed that she feels very anxious and I ordered Xanax 0.25 mg x 2 with holding parameters.  Her d-dimer is negative.  CTA thoracoabdominal aorta did not show any acute abnormalities  Ordered nuclear stress test due to continued episodes of chest pain

## 2020-03-23 NOTE — ASSESSMENT & PLAN NOTE
She has minimal elevation of lipase per  CT scan did not show any acute abnormalities  Ultrasound right upper quadrant did not show any acute abnormalities.  3/24 advance diet from clear liquid to GI soft

## 2020-03-23 NOTE — ED NOTES
"Patient crying and yelling in room. Encouraged patient to take deep breaths. Educated patient on need for labs. Patient allowed RN to draw labs but refused teaching calling this RN a \"fucking liar.\" Security at bedside.   "

## 2020-03-23 NOTE — PROGRESS NOTES
2 RN Skin Check complete    All skin is clean, dry, intact. No areas of concern for pressure injury

## 2020-03-23 NOTE — DISCHARGE PLANNING
Patient is eligible for Medicaid Meds to Beds at discharge if they have coverage with [place] Medicaid, Medicaid FFS, Medicaid HMO ([facility]), or [place]. This service is provided through the [facility] if orders are received prior to 4 p.m. Monday through Friday, excluding holidays. Please call [phone] prior to discharge.

## 2020-03-23 NOTE — PROGRESS NOTES
Pt on unit from ED. Assumed patient care. Pt assessment complete. Pt A&Ox4. Reviewed plan of care with pt. Tele box on and rhythm verified. Chart and labs reviewed. Bed in lowest position, and 2 side rails up. Pt educated on call light; call light with in reach.

## 2020-03-24 PROBLEM — R09.81 NASAL CONGESTION: Status: ACTIVE | Noted: 2020-03-24

## 2020-03-24 LAB
ALBUMIN SERPL BCP-MCNC: 3.4 G/DL (ref 3.2–4.9)
ALBUMIN/GLOB SERPL: 1.3 G/DL
ALP SERPL-CCNC: 53 U/L (ref 30–99)
ALT SERPL-CCNC: 11 U/L (ref 2–50)
ANION GAP SERPL CALC-SCNC: 8 MMOL/L (ref 7–16)
AST SERPL-CCNC: 8 U/L (ref 12–45)
BILIRUB SERPL-MCNC: 0.4 MG/DL (ref 0.1–1.5)
BUN SERPL-MCNC: 8 MG/DL (ref 8–22)
CALCIUM SERPL-MCNC: 8.3 MG/DL (ref 8.5–10.5)
CHLORIDE SERPL-SCNC: 107 MMOL/L (ref 96–112)
CO2 SERPL-SCNC: 22 MMOL/L (ref 20–33)
CREAT SERPL-MCNC: 0.65 MG/DL (ref 0.5–1.4)
EKG IMPRESSION: NORMAL
ERYTHROCYTE [DISTWIDTH] IN BLOOD BY AUTOMATED COUNT: 43.3 FL (ref 35.9–50)
GLOBULIN SER CALC-MCNC: 2.7 G/DL (ref 1.9–3.5)
GLUCOSE SERPL-MCNC: 80 MG/DL (ref 65–99)
HCT VFR BLD AUTO: 41.5 % (ref 37–47)
HGB BLD-MCNC: 13.8 G/DL (ref 12–16)
LIPASE SERPL-CCNC: 22 U/L (ref 11–82)
MAGNESIUM SERPL-MCNC: 2.2 MG/DL (ref 1.5–2.5)
MCH RBC QN AUTO: 30.9 PG (ref 27–33)
MCHC RBC AUTO-ENTMCNC: 33.3 G/DL (ref 33.6–35)
MCV RBC AUTO: 92.8 FL (ref 81.4–97.8)
PLATELET # BLD AUTO: 280 K/UL (ref 164–446)
PMV BLD AUTO: 8 FL (ref 9–12.9)
POTASSIUM SERPL-SCNC: 3.9 MMOL/L (ref 3.6–5.5)
PROT SERPL-MCNC: 6.1 G/DL (ref 6–8.2)
RBC # BLD AUTO: 4.47 M/UL (ref 4.2–5.4)
SODIUM SERPL-SCNC: 137 MMOL/L (ref 135–145)
WBC # BLD AUTO: 9.4 K/UL (ref 4.8–10.8)

## 2020-03-24 PROCEDURE — 700102 HCHG RX REV CODE 250 W/ 637 OVERRIDE(OP): Performed by: INTERNAL MEDICINE

## 2020-03-24 PROCEDURE — 700101 HCHG RX REV CODE 250: Performed by: INTERNAL MEDICINE

## 2020-03-24 PROCEDURE — 99225 PR SUBSEQUENT OBSERVATION CARE,LEVEL II: CPT | Performed by: INTERNAL MEDICINE

## 2020-03-24 PROCEDURE — A9270 NON-COVERED ITEM OR SERVICE: HCPCS | Performed by: INTERNAL MEDICINE

## 2020-03-24 PROCEDURE — 85027 COMPLETE CBC AUTOMATED: CPT

## 2020-03-24 PROCEDURE — 83735 ASSAY OF MAGNESIUM: CPT

## 2020-03-24 PROCEDURE — 93010 ELECTROCARDIOGRAM REPORT: CPT | Performed by: INTERNAL MEDICINE

## 2020-03-24 PROCEDURE — G0378 HOSPITAL OBSERVATION PER HR: HCPCS

## 2020-03-24 PROCEDURE — 700111 HCHG RX REV CODE 636 W/ 250 OVERRIDE (IP): Performed by: INTERNAL MEDICINE

## 2020-03-24 PROCEDURE — 96372 THER/PROPH/DIAG INJ SC/IM: CPT

## 2020-03-24 PROCEDURE — 700105 HCHG RX REV CODE 258: Performed by: INTERNAL MEDICINE

## 2020-03-24 PROCEDURE — 96375 TX/PRO/DX INJ NEW DRUG ADDON: CPT

## 2020-03-24 PROCEDURE — 83690 ASSAY OF LIPASE: CPT

## 2020-03-24 PROCEDURE — 80053 COMPREHEN METABOLIC PANEL: CPT

## 2020-03-24 PROCEDURE — 36415 COLL VENOUS BLD VENIPUNCTURE: CPT

## 2020-03-24 PROCEDURE — 96376 TX/PRO/DX INJ SAME DRUG ADON: CPT

## 2020-03-24 RX ORDER — OXYMETAZOLINE HYDROCHLORIDE 0.05 G/100ML
2 SPRAY NASAL 2 TIMES DAILY
Status: DISCONTINUED | OUTPATIENT
Start: 2020-03-24 | End: 2020-03-25 | Stop reason: HOSPADM

## 2020-03-24 RX ORDER — LIDOCAINE 50 MG/G
1 PATCH TOPICAL EVERY 24 HOURS
Status: DISCONTINUED | OUTPATIENT
Start: 2020-03-24 | End: 2020-03-25 | Stop reason: HOSPADM

## 2020-03-24 RX ADMIN — CLINDAMYCIN HYDROCHLORIDE 300 MG: 150 CAPSULE ORAL at 05:30

## 2020-03-24 RX ADMIN — OXYMETAZOLINE HCL 2 SPRAY: 0.05 SPRAY NASAL at 17:20

## 2020-03-24 RX ADMIN — ALPRAZOLAM 0.25 MG: 0.25 TABLET ORAL at 12:02

## 2020-03-24 RX ADMIN — CLINDAMYCIN HYDROCHLORIDE 300 MG: 150 CAPSULE ORAL at 22:46

## 2020-03-24 RX ADMIN — OXYMETAZOLINE HCL 2 SPRAY: 0.05 SPRAY NASAL at 11:11

## 2020-03-24 RX ADMIN — HYDROMORPHONE HYDROCHLORIDE 0.5 MG: 1 INJECTION, SOLUTION INTRAMUSCULAR; INTRAVENOUS; SUBCUTANEOUS at 16:45

## 2020-03-24 RX ADMIN — ENOXAPARIN SODIUM 40 MG: 100 INJECTION SUBCUTANEOUS at 05:28

## 2020-03-24 RX ADMIN — ENALAPRILAT 1.25 MG: 1.25 INJECTION INTRAVENOUS at 15:20

## 2020-03-24 RX ADMIN — LIDOCAINE 1 PATCH: 50 PATCH TOPICAL at 11:10

## 2020-03-24 RX ADMIN — ONDANSETRON 4 MG: 2 INJECTION INTRAMUSCULAR; INTRAVENOUS at 22:48

## 2020-03-24 RX ADMIN — HYDROMORPHONE HYDROCHLORIDE 0.5 MG: 1 INJECTION, SOLUTION INTRAMUSCULAR; INTRAVENOUS; SUBCUTANEOUS at 05:22

## 2020-03-24 RX ADMIN — OMEPRAZOLE 20 MG: 20 CAPSULE, DELAYED RELEASE ORAL at 05:28

## 2020-03-24 RX ADMIN — ACETAMINOPHEN 650 MG: 325 TABLET, FILM COATED ORAL at 17:22

## 2020-03-24 RX ADMIN — LISINOPRIL 10 MG: 10 TABLET ORAL at 05:28

## 2020-03-24 RX ADMIN — SODIUM CHLORIDE: 9 INJECTION, SOLUTION INTRAVENOUS at 11:11

## 2020-03-24 RX ADMIN — HYDROMORPHONE HYDROCHLORIDE 0.5 MG: 1 INJECTION, SOLUTION INTRAMUSCULAR; INTRAVENOUS; SUBCUTANEOUS at 13:37

## 2020-03-24 RX ADMIN — OXYCODONE HYDROCHLORIDE 10 MG: 10 TABLET ORAL at 08:57

## 2020-03-24 RX ADMIN — HYDROMORPHONE HYDROCHLORIDE 0.5 MG: 1 INJECTION, SOLUTION INTRAMUSCULAR; INTRAVENOUS; SUBCUTANEOUS at 19:49

## 2020-03-24 RX ADMIN — ENALAPRILAT 1.25 MG: 1.25 INJECTION INTRAVENOUS at 16:09

## 2020-03-24 RX ADMIN — CLINDAMYCIN HYDROCHLORIDE 300 MG: 150 CAPSULE ORAL at 13:37

## 2020-03-24 RX ADMIN — ASPIRIN 325 MG: 325 TABLET, FILM COATED ORAL at 05:28

## 2020-03-24 RX ADMIN — HYDROMORPHONE HYDROCHLORIDE 0.5 MG: 1 INJECTION, SOLUTION INTRAMUSCULAR; INTRAVENOUS; SUBCUTANEOUS at 22:46

## 2020-03-24 ASSESSMENT — ENCOUNTER SYMPTOMS
TREMORS: 0
EYE PAIN: 0
NECK PAIN: 0
MYALGIAS: 0
PHOTOPHOBIA: 0
SENSORY CHANGE: 0
WEIGHT LOSS: 0
ABDOMINAL PAIN: 0
DIZZINESS: 0
HEADACHES: 0
CHILLS: 0
NERVOUS/ANXIOUS: 1
DOUBLE VISION: 0
NAUSEA: 0
PALPITATIONS: 0
HALLUCINATIONS: 0
CONSTIPATION: 0
SPUTUM PRODUCTION: 0
COUGH: 0
VOMITING: 0
FOCAL WEAKNESS: 0
BLURRED VISION: 0
SPEECH CHANGE: 0
BACK PAIN: 0
ORTHOPNEA: 0
SHORTNESS OF BREATH: 0
DIARRHEA: 0
FEVER: 0
TINGLING: 0

## 2020-03-24 ASSESSMENT — LIFESTYLE VARIABLES: SUBSTANCE_ABUSE: 0

## 2020-03-24 NOTE — ASSESSMENT & PLAN NOTE
he has   nasal congestion.  She denies cough, fever, no recent travel outside [place] in the last 2 weeks and no history  contact to people infected with COVID19.  I ordered oxymetazoline nasal spray

## 2020-03-24 NOTE — CARE PLAN
Problem: Infection  Goal: Will remain free from infection  Outcome: PROGRESSING AS EXPECTED  Note: Standard precautions in place. Education provided to patient. Hand hygiene being preformed correctly prior to and after patient contact by staff.       Problem: Venous Thromboembolism (VTW)/Deep Vein Thrombosis (DVT) Prevention:  Goal: Patient will participate in Venous Thrombosis (VTE)/Deep Vein Thrombosis (DVT)Prevention Measures  Outcome: PROGRESSING AS EXPECTED  Note: Lovenox being administered appropriately per orders. Encourage frequent ambulation.

## 2020-03-24 NOTE — CARE PLAN
Problem: Safety  Goal: Will remain free from injury  Outcome: PROGRESSING AS EXPECTED  Goal: Will remain free from falls  Outcome: PROGRESSING AS EXPECTED     Problem: Communication  Goal: The ability to communicate needs accurately and effectively will improve  Outcome: PROGRESSING AS EXPECTED

## 2020-03-24 NOTE — PROGRESS NOTES
Per NM, patient stress test scheduled for 03/25/2020 due to patient having caffeine this AM. Patient educated regarding diet restrictions for stress test and verbalized understanding.

## 2020-03-24 NOTE — PROGRESS NOTES
[facility] Medicine Daily Progress Note    Date of Service  3/24/2020    Chief Complaint  39 y.o. female admitted 3/22/2020 with chest pain    Hospital Course    *39-year-old female presented to the hospital on March 22, 2020 with complaint of chest pain.  She underwent CTA thoracoabdominal aorta did not show any acute or maladies.  She also underwent CT abdomen and pelvis without contrast and did not show any acute abnormalities.  I ordered echocardiogram.  Her troponin is negative x3.*      Interval Problem Update    Patient had another episode of chest pain, in the middle of the chest, sharp, worsening with deep inspiration, alleviated by Xanax and oxycodone.  I ordered stress test.  She has   nasal congestion.  She denies cough, fever, recent travel or contact to people infected with COVID19.  I ordered oxymetazoline nasal spray  She has abdominal discomfort and occasional nausea.  I will advance diet to GI soft at lunch.    Consultants/Specialty  None    Code Status  Full code    Disposition  Home when medically stable    Review of Systems  Review of Systems   Constitutional: Negative for chills, fever and weight loss.   HENT: Negative for hearing loss and tinnitus.    Eyes: Negative for blurred vision, double vision, photophobia and pain.   Respiratory: Negative for cough, sputum production and shortness of breath.    Cardiovascular: Positive for chest pain. Negative for palpitations, orthopnea and leg swelling.   Gastrointestinal: Negative for abdominal pain, constipation, diarrhea, nausea and vomiting.   Genitourinary: Negative for dysuria, frequency and urgency.   Musculoskeletal: Negative for back pain, joint pain, myalgias and neck pain.   Skin: Negative for rash.   Neurological: Negative for dizziness, tingling, tremors, sensory change, speech change, focal weakness and headaches.   Psychiatric/Behavioral: Negative for hallucinations and substance abuse. The patient is nervous/anxious.    All other systems  reviewed and are negative.       Physical Exam  Temp:  [36.1 °C (97 °F)-36.8 °C (98.3 °F)] 36.1 °C (97 °F)  Pulse:  [70-78] 76  Resp:  [17-18] 18  BP: (127-157)/(79-90) 157/90  SpO2:  [97 %-99 %] 97 %    Physical Exam  Vitals signs reviewed.   Constitutional:       General: She is not in acute distress.     Appearance: Normal appearance. She is not ill-appearing.   HENT:      Head: Normocephalic and atraumatic.      Nose: No congestion.   Eyes:      General:         Right eye: No discharge.         Left eye: No discharge.      Pupils: Pupils are equal, round, and reactive to light.   Neck:      Musculoskeletal: Normal range of motion. No neck rigidity.   Cardiovascular:      Rate and Rhythm: Normal rate and regular rhythm.      Pulses: Normal pulses.      Heart sounds: Normal heart sounds. No murmur.   Pulmonary:      Effort: Pulmonary effort is normal. No respiratory distress.      Breath sounds: Normal breath sounds. No stridor.   Chest:      Chest wall: Tenderness present.   Abdominal:      General: Bowel sounds are normal. There is no distension.      Palpations: Abdomen is soft.      Tenderness: There is no abdominal tenderness.   Musculoskeletal: Normal range of motion.         General: No swelling or tenderness.   Skin:     General: Skin is warm.      Capillary Refill: Capillary refill takes less than 2 seconds.      Coloration: Skin is not jaundiced or pale.      Findings: No bruising.   Neurological:      General: No focal deficit present.      Mental Status: She is alert and oriented to person, place, and time.      Cranial Nerves: No cranial nerve deficit.   Psychiatric:         Mood and Affect: Mood is anxious.         Behavior: Behavior normal.       Examined the patient on 3/24.  No significant changes noted from 3/23 except as documented above  Fluids    Intake/Output Summary (Last 24 hours) at 3/24/2020 1434  Last data filed at 3/24/2020 0900  Gross per 24 hour   Intake 120 ml   Output --   Net 120  ml       Laboratory  Recent Labs     03/22/20  1544 03/23/20  0648 03/24/20  0330   WBC 13.7* 10.5 9.4   RBC 4.52 4.16* 4.47   HEMOGLOBIN 14.4 13.4 13.8   HEMATOCRIT 41.9 38.6 41.5   MCV 92.7 92.8 92.8   MCH 31.9 32.2 30.9   MCHC 34.4 34.7 33.3*   RDW 43.8 43.8 43.3   PLATELETCT 356 300 280   MPV 8.3* 8.1* 8.0*     Recent Labs     03/22/20  1544 03/23/20  0230 03/24/20  0330   SODIUM 136 137 137   POTASSIUM 4.3 4.4 3.9   CHLORIDE 105 107 107   CO2 18* 18* 22   GLUCOSE 82 80 80   BUN 30* 20 8   CREATININE 1.15 0.90 0.65   CALCIUM 9.4 8.7 8.3*                   Imaging  EC-ECHOCARDIOGRAM COMPLETE W/O CONT   Final Result      US-RUQ   Final Result      Negative gallbladder/right upper quadrant ultrasound      CT-CTA COMPLETE THORACOABDOMINAL AORTA   Final Result      1.  Negative for aortic aneurysm or dissection      2.  Overall, no acute process      3.  Hepatic steatosis and hepatomegaly      4.  Incidental left renal cyst      No follow up imaging is recommended per consensus guidelines of the 2019 ACR Incidental Findings Committee for probably benign incidental simple appearing renal cystic lesion(s) based on imaging criteria.            DX-CHEST-PORTABLE (1 VIEW)   Final Result      No evidence of acute cardiopulmonary process.      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  Troponin negative x3.  I ordered echocardiogram to evaluate for any wall motion abnormalities.  Patient expressed that she feels very anxious and I ordered Xanax 0.25 mg x 2 with holding parameters.  Her d-dimer is negative.  CTA thoracoabdominal aorta did not show any acute abnormalities  Ordered nuclear stress test due to continued episodes of  chest pain    Leucocytosis- (present on admission)  Assessment & Plan  Most likely reactive to  She remains afebrile    Nasal congestion  Assessment & Plan  he has   nasal congestion.  She denies cough, fever, no recent travel outside [place] in the last 2 weeks and no history  contact to people infected with COVID19.  I ordered oxymetazoline nasal spray      RADHA (acute kidney injury) (HCC)- (present on admission)  Assessment & Plan  Due to dehydration.  Renal function has been improving.  Continue to monitor.      Pancreatitis- (present on admission)  Assessment & Plan  She has minimal elevation of lipase per  CT scan did not show any acute abnormalities  Ultrasound right upper quadrant did not show any acute abnormalities.  3/24 advance diet from clear liquid to GI soft    Tobacco abuse- (present on admission)  Assessment & Plan  Counseling provided by admitting physician    HTN (hypertension)- (present on admission)  Assessment & Plan  Continue outpatient lisinopril  Continue to monitor     I discussed plan of care during multidisciplinary rounds.    VTE prophylaxis: Lovenox

## 2020-03-25 ENCOUNTER — PATIENT OUTREACH (OUTPATIENT)
Dept: HEALTH INFORMATION MANAGEMENT | Facility: OTHER | Age: 40
End: 2020-03-25

## 2020-03-25 ENCOUNTER — APPOINTMENT (OUTPATIENT)
Dept: RADIOLOGY | Facility: MEDICAL CENTER | Age: 40
End: 2020-03-25
Attending: INTERNAL MEDICINE
Payer: MEDICAID

## 2020-03-25 VITALS
WEIGHT: 179.9 LBS | RESPIRATION RATE: 17 BRPM | TEMPERATURE: 97.8 F | DIASTOLIC BLOOD PRESSURE: 84 MMHG | OXYGEN SATURATION: 98 % | HEIGHT: 66 IN | SYSTOLIC BLOOD PRESSURE: 137 MMHG | HEART RATE: 89 BPM | BODY MASS INDEX: 28.91 KG/M2

## 2020-03-25 PROBLEM — J40 BRONCHITIS: Status: ACTIVE | Noted: 2020-03-25

## 2020-03-25 LAB
ALBUMIN SERPL BCP-MCNC: 3.9 G/DL (ref 3.2–4.9)
ALBUMIN/GLOB SERPL: 1.4 G/DL
ALP SERPL-CCNC: 54 U/L (ref 30–99)
ALT SERPL-CCNC: 13 U/L (ref 2–50)
ANION GAP SERPL CALC-SCNC: 10 MMOL/L (ref 7–16)
AST SERPL-CCNC: 11 U/L (ref 12–45)
BASOPHILS # BLD AUTO: 0.7 % (ref 0–1.8)
BASOPHILS # BLD: 0.06 K/UL (ref 0–0.12)
BILIRUB SERPL-MCNC: 0.3 MG/DL (ref 0.1–1.5)
BUN SERPL-MCNC: 7 MG/DL (ref 8–22)
CALCIUM SERPL-MCNC: 8.6 MG/DL (ref 8.5–10.5)
CHLORIDE SERPL-SCNC: 106 MMOL/L (ref 96–112)
CO2 SERPL-SCNC: 22 MMOL/L (ref 20–33)
CREAT SERPL-MCNC: 0.65 MG/DL (ref 0.5–1.4)
EOSINOPHIL # BLD AUTO: 0.22 K/UL (ref 0–0.51)
EOSINOPHIL NFR BLD: 2.5 % (ref 0–6.9)
ERYTHROCYTE [DISTWIDTH] IN BLOOD BY AUTOMATED COUNT: 41.9 FL (ref 35.9–50)
GLOBULIN SER CALC-MCNC: 2.8 G/DL (ref 1.9–3.5)
GLUCOSE SERPL-MCNC: 92 MG/DL (ref 65–99)
HCT VFR BLD AUTO: 41.3 % (ref 37–47)
HGB BLD-MCNC: 13.9 G/DL (ref 12–16)
IMM GRANULOCYTES # BLD AUTO: 0.02 K/UL (ref 0–0.11)
IMM GRANULOCYTES NFR BLD AUTO: 0.2 % (ref 0–0.9)
LIPASE SERPL-CCNC: 21 U/L (ref 11–82)
LYMPHOCYTES # BLD AUTO: 2.01 K/UL (ref 1–4.8)
LYMPHOCYTES NFR BLD: 22.6 % (ref 22–41)
MCH RBC QN AUTO: 30.9 PG (ref 27–33)
MCHC RBC AUTO-ENTMCNC: 33.7 G/DL (ref 33.6–35)
MCV RBC AUTO: 91.8 FL (ref 81.4–97.8)
MONOCYTES # BLD AUTO: 1.12 K/UL (ref 0–0.85)
MONOCYTES NFR BLD AUTO: 12.6 % (ref 0–13.4)
NEUTROPHILS # BLD AUTO: 5.48 K/UL (ref 2–7.15)
NEUTROPHILS NFR BLD: 61.4 % (ref 44–72)
NRBC # BLD AUTO: 0 K/UL
NRBC BLD-RTO: 0 /100 WBC
PLATELET # BLD AUTO: 313 K/UL (ref 164–446)
PMV BLD AUTO: 8.1 FL (ref 9–12.9)
POTASSIUM SERPL-SCNC: 4 MMOL/L (ref 3.6–5.5)
PROT SERPL-MCNC: 6.7 G/DL (ref 6–8.2)
RBC # BLD AUTO: 4.5 M/UL (ref 4.2–5.4)
SODIUM SERPL-SCNC: 138 MMOL/L (ref 135–145)
WBC # BLD AUTO: 8.9 K/UL (ref 4.8–10.8)

## 2020-03-25 PROCEDURE — A9270 NON-COVERED ITEM OR SERVICE: HCPCS | Performed by: INTERNAL MEDICINE

## 2020-03-25 PROCEDURE — 96376 TX/PRO/DX INJ SAME DRUG ADON: CPT | Mod: XU

## 2020-03-25 PROCEDURE — 80053 COMPREHEN METABOLIC PANEL: CPT

## 2020-03-25 PROCEDURE — 96372 THER/PROPH/DIAG INJ SC/IM: CPT | Mod: XU

## 2020-03-25 PROCEDURE — 700111 HCHG RX REV CODE 636 W/ 250 OVERRIDE (IP): Performed by: INTERNAL MEDICINE

## 2020-03-25 PROCEDURE — 36415 COLL VENOUS BLD VENIPUNCTURE: CPT

## 2020-03-25 PROCEDURE — A9502 TC99M TETROFOSMIN: HCPCS

## 2020-03-25 PROCEDURE — 85025 COMPLETE CBC W/AUTO DIFF WBC: CPT

## 2020-03-25 PROCEDURE — 700102 HCHG RX REV CODE 250 W/ 637 OVERRIDE(OP): Performed by: INTERNAL MEDICINE

## 2020-03-25 PROCEDURE — 700111 HCHG RX REV CODE 636 W/ 250 OVERRIDE (IP)

## 2020-03-25 PROCEDURE — G0378 HOSPITAL OBSERVATION PER HR: HCPCS

## 2020-03-25 PROCEDURE — 83690 ASSAY OF LIPASE: CPT

## 2020-03-25 PROCEDURE — 99217 PR OBSERVATION CARE DISCHARGE: CPT | Performed by: INTERNAL MEDICINE

## 2020-03-25 RX ORDER — ALPRAZOLAM 0.5 MG/1
0.5 TABLET ORAL ONCE
Status: COMPLETED | OUTPATIENT
Start: 2020-03-25 | End: 2020-03-25

## 2020-03-25 RX ORDER — CLINDAMYCIN HYDROCHLORIDE 300 MG/1
300 CAPSULE ORAL 3 TIMES DAILY
Qty: 15 CAP | Refills: 0 | Status: SHIPPED | OUTPATIENT
Start: 2020-03-25 | End: 2020-03-30

## 2020-03-25 RX ORDER — CLINDAMYCIN HYDROCHLORIDE 300 MG/1
300 CAPSULE ORAL 3 TIMES DAILY
Qty: 15 CAP | Refills: 0 | Status: SHIPPED | OUTPATIENT
Start: 2020-03-25 | End: 2020-03-25 | Stop reason: SDUPTHER

## 2020-03-25 RX ORDER — OMEPRAZOLE 20 MG/1
20 CAPSULE, DELAYED RELEASE ORAL DAILY
Qty: 30 CAP | Refills: 0 | Status: SHIPPED
Start: 2020-03-26 | End: 2020-03-25

## 2020-03-25 RX ORDER — GUAIFENESIN 1200 MG/1
1 TABLET, EXTENDED RELEASE ORAL 2 TIMES DAILY
Qty: 10 TAB | Refills: 0 | Status: SHIPPED | OUTPATIENT
Start: 2020-03-25 | End: 2020-03-25 | Stop reason: SDUPTHER

## 2020-03-25 RX ORDER — AZITHROMYCIN 500 MG/1
500 TABLET, FILM COATED ORAL DAILY
Qty: 3 TAB | Refills: 0 | Status: SHIPPED
Start: 2020-03-25

## 2020-03-25 RX ORDER — LISINOPRIL 10 MG/1
10 TABLET ORAL DAILY
Qty: 30 TAB | Refills: 0 | Status: SHIPPED
Start: 2020-03-25

## 2020-03-25 RX ORDER — METHYLPREDNISOLONE 4 MG/1
TABLET ORAL
Qty: 21 TAB | Refills: 0 | Status: SHIPPED | OUTPATIENT
Start: 2020-03-25 | End: 2020-03-25 | Stop reason: SDUPTHER

## 2020-03-25 RX ORDER — METHYLPREDNISOLONE 4 MG/1
TABLET ORAL
Qty: 21 TAB | Refills: 0 | Status: SHIPPED | OUTPATIENT
Start: 2020-03-25

## 2020-03-25 RX ORDER — ALPRAZOLAM 0.5 MG/1
0.25 TABLET ORAL EVERY 12 HOURS PRN
Qty: 3 TAB | Refills: 0 | Status: SHIPPED | OUTPATIENT
Start: 2020-03-25 | End: 2020-03-25 | Stop reason: SDUPTHER

## 2020-03-25 RX ORDER — LISINOPRIL 10 MG/1
10 TABLET ORAL DAILY
Qty: 30 TAB | Refills: 0 | Status: SHIPPED | OUTPATIENT
Start: 2020-03-25 | End: 2020-03-25 | Stop reason: SDUPTHER

## 2020-03-25 RX ORDER — AZITHROMYCIN 500 MG/1
500 TABLET, FILM COATED ORAL DAILY
Qty: 3 TAB | Refills: 0 | Status: SHIPPED | OUTPATIENT
Start: 2020-03-25 | End: 2020-03-25 | Stop reason: SDUPTHER

## 2020-03-25 RX ORDER — REGADENOSON 0.08 MG/ML
0.4 INJECTION, SOLUTION INTRAVENOUS ONCE
Status: COMPLETED | OUTPATIENT
Start: 2020-03-25 | End: 2020-03-25

## 2020-03-25 RX ORDER — ALPRAZOLAM 0.5 MG/1
.25-.5 TABLET ORAL 3 TIMES DAILY PRN
Qty: 12 TAB | Refills: 0 | Status: SHIPPED | OUTPATIENT
Start: 2020-03-25 | End: 2020-03-29

## 2020-03-25 RX ORDER — GUAIFENESIN 1200 MG/1
1 TABLET, EXTENDED RELEASE ORAL 2 TIMES DAILY
Qty: 10 TAB | Refills: 0 | Status: SHIPPED
Start: 2020-03-25

## 2020-03-25 RX ORDER — REGADENOSON 0.08 MG/ML
INJECTION, SOLUTION INTRAVENOUS
Status: COMPLETED
Start: 2020-03-25 | End: 2020-03-25

## 2020-03-25 RX ORDER — OMEPRAZOLE 20 MG/1
20 CAPSULE, DELAYED RELEASE ORAL DAILY
Qty: 30 CAP | Refills: 0 | Status: SHIPPED | OUTPATIENT
Start: 2020-03-26

## 2020-03-25 RX ADMIN — HYDROMORPHONE HYDROCHLORIDE 0.5 MG: 1 INJECTION, SOLUTION INTRAMUSCULAR; INTRAVENOUS; SUBCUTANEOUS at 09:19

## 2020-03-25 RX ADMIN — OMEPRAZOLE 20 MG: 20 CAPSULE, DELAYED RELEASE ORAL at 06:19

## 2020-03-25 RX ADMIN — OXYMETAZOLINE HCL 2 SPRAY: 0.05 SPRAY NASAL at 06:18

## 2020-03-25 RX ADMIN — ONDANSETRON 4 MG: 2 INJECTION INTRAMUSCULAR; INTRAVENOUS at 03:36

## 2020-03-25 RX ADMIN — REGADENOSON 0.4 MG: 0.08 INJECTION, SOLUTION INTRAVENOUS at 08:18

## 2020-03-25 RX ADMIN — HYDROMORPHONE HYDROCHLORIDE 0.5 MG: 1 INJECTION, SOLUTION INTRAMUSCULAR; INTRAVENOUS; SUBCUTANEOUS at 03:33

## 2020-03-25 RX ADMIN — LISINOPRIL 10 MG: 10 TABLET ORAL at 06:18

## 2020-03-25 RX ADMIN — CLINDAMYCIN HYDROCHLORIDE 300 MG: 150 CAPSULE ORAL at 06:19

## 2020-03-25 RX ADMIN — ENOXAPARIN SODIUM 40 MG: 100 INJECTION SUBCUTANEOUS at 06:18

## 2020-03-25 RX ADMIN — ALPRAZOLAM 0.5 MG: 0.5 TABLET ORAL at 12:19

## 2020-03-25 RX ADMIN — HYDROMORPHONE HYDROCHLORIDE 0.5 MG: 1 INJECTION, SOLUTION INTRAMUSCULAR; INTRAVENOUS; SUBCUTANEOUS at 06:19

## 2020-03-25 ASSESSMENT — FIBROSIS 4 INDEX: FIB4 SCORE: 0.39

## 2020-03-25 NOTE — CARE PLAN
Problem: Pain Management  Goal: Pain level will decrease to patient's comfort goal  Outcome: PROGRESSING AS EXPECTED  Note: Frequent pain assessments performed, appropriate pharm and non-pharmacological interventions provided.     Problem: Psychosocial Needs:  Goal: Level of anxiety will decrease  Outcome: PROGRESSING AS EXPECTED  Note: Educated about deep breathing and distraction to reduce anxiety, offered PRN anti-anxiety medications.

## 2020-03-25 NOTE — DISCHARGE SUMMARY
Discharge Summary    CHIEF COMPLAINT ON ADMISSION  Chief Complaint   Patient presents with   • Chest Pain       Reason for Admission  EMS R-9 CP     Admission Date  3/22/2020    CODE STATUS  Full Code    HPI & HOSPITAL COURSE    *39-year-old female presented to the hospital on March 22, 2020 with complaint of chest pain.  She underwent CTA thoracoabdominal aorta did not show any acute or maladies.  She also underwent CT abdomen and pelvis without contrast and did not show any acute abnormalities.  Heart ultrasound did not reveal acute abnormalities.  Her troponin is negative x3.*Subsequently she undergone stress test, which was negative for ischemia.  Patient started having congestion and cough with chest x-ray showing no evidence of acute cardiopulmonary process.  She had no risk factors for COVID19 (no history of travel in the last 14 days, no direct contact to people infected with COVID19, no immunodeficiency or serious comorbidities).  She demonstrated anxiety in the hospital.  She was discharged home to continue treatment with azithromycin, Medrol pack, guaifenesin, Xanax for anxiety.  She received counseling regarding smoking  Cessation  Patient is to continue clindamycin for dental infection as well    Therefore, she is discharged in fair and stable condition to home with close outpatient follow-up.        Discharge Date  3/25/2020    FOLLOW UP ITEMS POST DISCHARGE  PCP as needed    DISCHARGE DIAGNOSES  Principal Problem:    Chest pain POA: Yes  Active Problems:    Bronchitis POA: Unknown    Leucocytosis POA: Yes    HTN (hypertension) POA: Yes    Tobacco abuse POA: Yes    Pancreatitis POA: Yes    RADHA (acute kidney injury) (HCC) POA: Yes    Nasal congestion POA: Unknown  Resolved Problems:    * No resolved hospital problems. *      FOLLOW UP  No future appointments.  [facility]  [address], [place] [address]  [phone]     Please call to establish with a Primary Care Physician and schedule a hospital  follow up. Thank you     [address] [id number] [phone]    Please call to establish with a Primary Care Physician and schedule a hospital follow up. Thank you       MEDICATIONS ON DISCHARGE     Medication List      START taking these medications      Instructions   ALPRAZolam 0.5 MG Tabs  Commonly known as:  XANAX   Take 0.5-1 Tabs by mouth 3 times a day as needed for Anxiety for up to 4 days.  Dose:  0.25-0.5 mg     azithromycin 500 MG tablet  Commonly known as:  ZITHROMAX   Take 1 Tab by mouth every day.  Dose:  500 mg     Guaifenesin 1200 MG Tb12   Take 1 Tab by mouth 2 Times a Day.  Dose:  1 Tab     methylPREDNISolone 4 MG Tbpk  Commonly known as:  MEDROL DOSEPAK   Follow schedule on package instructions.     omeprazole 20 MG delayed-release capsule  Start taking on:  March 26, 2020  Commonly known as:  PRILOSEC   Take 1 Cap by mouth every day.  Dose:  20 mg        CHANGE how you take these medications      Instructions   clindamycin 300 MG Caps  What changed:  medication strength  Commonly known as:  CLEOCIN   Take 1 Cap by mouth 3 times a day for 5 days.  Dose:  300 mg        CONTINUE taking these medications      Instructions   lisinopril 10 MG Tabs  Commonly known as:  PRINIVIL   Take 1 Tab by mouth every day.  Dose:  10 mg        STOP taking these medications    HYDROcodone-acetaminophen 7.5-325 MG per tablet  Commonly known as:  NORCO     ibuprofen 800 MG Tabs  Commonly known as:  MOTRIN            Allergies  Allergies   Allergen Reactions   • Ativan [Lorazepam]      Pt reports that it makes her hallucinate    • Droperidol Hives and Shortness of Breath   • Iodine Rash   • Penicillins Hives   • Phenergan [Promethazine Hcl] Hives   • Reglan [Metoclopramide Hcl] Hives   • Rocephin [Ceftriaxone Sodium] Hives   • Toradol Vomiting and Nausea       DIET  Orders Placed This Encounter   Procedures   • Diet Order Low Fiber(GI Soft)     Standing Status:    Standing     Number of Occurrences:   1     Order Specific Question:   Diet:     Answer:   Low Fiber(GI Soft) [2]       ACTIVITY  As tolerated.  Weight bearing as tolerated    CONSULTATIONS  None    PROCEDURES  None    LABORATORY  Lab Results   Component Value Date    SODIUM 138 03/25/2020    POTASSIUM 4.0 03/25/2020    CHLORIDE 106 03/25/2020    CO2 22 03/25/2020    GLUCOSE 92 03/25/2020    BUN 7 (L) 03/25/2020    CREATININE 0.65 03/25/2020        Lab Results   Component Value Date    WBC 8.9 03/25/2020    HEMOGLOBIN 13.9 03/25/2020    HEMATOCRIT 41.3 03/25/2020    PLATELETCT 313 03/25/2020        NM-CARDIAC STRESS TEST   Final Result      EC-ECHOCARDIOGRAM COMPLETE W/O CONT   Final Result      US-RUQ   Final Result      Negative gallbladder/right upper quadrant ultrasound      CT-CTA COMPLETE THORACOABDOMINAL AORTA   Final Result      1.  Negative for aortic aneurysm or dissection      2.  Overall, no acute process      3.  Hepatic steatosis and hepatomegaly      4.  Incidental left renal cyst      No follow up imaging is recommended per consensus guidelines of the 2019 ACR Incidental Findings Committee for probably benign incidental simple appearing renal cystic lesion(s) based on imaging criteria.            DX-CHEST-PORTABLE (1 VIEW)   Final Result      No evidence of acute cardiopulmonary process.            Total time of the discharge process exceeds 37 minutes.

## 2020-03-25 NOTE — DISCHARGE INSTRUCTIONS
Discharge Instructions    Discharged to home by car with relative. Discharged via wheelchair, hospital escort: Yes.  Special equipment needed: Not Applicable    Be sure to schedule a follow-up appointment with your primary care doctor or any specialists as instructed.     Discharge Plan:   Diet Plan: Discussed  Activity Level: Discussed  Smoking Cessation Offered: Patient Counseled  Confirmed Follow up Appointment: Patient to Call and Schedule Appointment  Confirmed Symptoms Management: Discussed  Medication Reconciliation Updated: Yes  Influenza Vaccine Indication: Patient Refuses    I understand that a diet low in cholesterol, fat, and sodium is recommended for good health. Unless I have been given specific instructions below for another diet, I accept this instruction as my diet prescription.   Other diet: GI soft     Special Instructions: None    · Is patient discharged on Warfarin / Coumadin?   No     Depression / Suicide Risk    As you are discharged from this [facility] facility, it is important to learn how to keep safe from harming yourself.    Recognize the warning signs:  · Abrupt changes in personality, positive or negative- including increase in energy   · Giving away possessions  · Change in eating patterns- significant weight changes-  positive or negative  · Change in sleeping patterns- unable to sleep or sleeping all the time   · Unwillingness or inability to communicate  · Depression  · Unusual sadness, discouragement and loneliness  · Talk of wanting to die  · Neglect of personal appearance   · Rebelliousness- reckless behavior  · Withdrawal from people/activities they love  · Confusion- inability to concentrate     If you or a loved one observes any of these behaviors or has concerns about self-harm, here's what you can do:  · Talk about it- your feelings and reasons for harming yourself  · Remove any means that you might use to hurt yourself (examples: pills, rope, extension cords,  firearm)  · Get professional help from the community (Mental Health, Substance Abuse, psychological counseling)  · Do not be alone:Call your Safe Contact- someone whom you trust who will be there for you.  · Call your local CRISIS HOTLINE 715-2299 or 073-494-0226  · Call your local Children's Mobile Crisis Response Team [place] (197) 793-5876 or www.Trusight  · Call the toll free National Suicide Prevention Hotlines   · National Suicide Prevention Lifeline 090-212-ZNAC (2948)  · [employer] Line Network 800-SUICIDE (876-7436)          Chest Wall Pain  Chest wall pain is pain in or around the bones and muscles of your chest. Sometimes, an injury causes this pain. Sometimes, the cause may not be known. This pain may take several weeks or longer to get better.  Follow these instructions at home:  Pay attention to any changes in your symptoms. Take these actions to help with your pain:  · Rest as told by your health care provider.  · Avoid activities that cause pain. These include any activities that use your chest muscles or your abdominal and side muscles to lift heavy items.  · If directed, apply ice to the painful area:  ¨ Put ice in a plastic bag.  ¨ Place a towel between your skin and the bag.  ¨ Leave the ice on for 20 minutes, 2-3 times per day.  · Take over-the-counter and prescription medicines only as told by your health care provider.  · Do not use tobacco products, including cigarettes, chewing tobacco, and e-cigarettes. If you need help quitting, ask your health care provider.  · Keep all follow-up visits as told by your health care provider. This is important.  Contact a health care provider if:  · You have a fever.  · Your chest pain becomes worse.  · You have new symptoms.  Get help right away if:  · You have nausea or vomiting.  · You feel sweaty or light-headed.  · You have a cough with phlegm (sputum) or you cough up blood.  · You develop shortness of breath.  This information is not  intended to replace advice given to you by your health care provider. Make sure you discuss any questions you have with your health care provider.  Document Released: 12/18/2006 Document Revised: 04/27/2017 Document Reviewed: 03/14/2016  [employer] Patient Education © 2017 [employer]    Alprazolam tablets  What is this medicine?  ALPRAZOLAM (al PRAY fabienne gleason) is a benzodiazepine. It is used to treat anxiety and panic attacks.  This medicine may be used for other purposes; ask your health care provider or pharmacist if you have questions.  COMMON BRAND NAME(S): Xanax  What should I tell my health care provider before I take this medicine?  They need to know if you have any of these conditions:  -an alcohol or drug abuse problem  -bipolar disorder, depression, psychosis or other mental health conditions  -glaucoma  -kidney or liver disease  -lung or breathing disease  -myasthenia gravis  -Parkinson's disease  -porphyria  -seizures or a history of seizures  -suicidal thoughts  -an unusual or allergic reaction to alprazolam, other benzodiazepines, foods, dyes, or preservatives  -pregnant or trying to get pregnant  -breast-feeding  How should I use this medicine?  Take this medicine by mouth with a glass of water. Follow the directions on the prescription label. Take your medicine at regular intervals. Do not take it more often than directed. Do not stop taking except on your doctor's advice.  A special MedGuide will be given to you by the pharmacist with each prescription and refill. Be sure to read this information carefully each time.  Talk to your pediatrician regarding the use of this medicine in children. Special care may be needed.  Overdosage: If you think you have taken too much of this medicine contact a poison control center or emergency room at once.  NOTE: This medicine is only for you. Do not share this medicine with others.  What if I miss a dose?  If you miss a dose, take it as soon as you  can. If it is almost time for your next dose, take only that dose. Do not take double or extra doses.  What may interact with this medicine?  Do not take this medicine with any of the following medications:  -certain antiviral medicines for HIV or AIDS like delavirdine, indinavir  -certain medicines for fungal infections like ketoconazole and itraconazole  -narcotic medicines for cough  -sodium oxybate  This medicine may also interact with the following medications:  -alcohol  -antihistamines for allergy, cough and cold  -certain antibiotics like clarithromycin, erythromycin, isoniazid, rifampin, rifapentine, rifabutin, and troleandomycin  -certain medicines for blood pressure, heart disease, irregular heart beat  -certain medicines for depression, like amitriptyline, fluoxetine, sertraline  -certain medicines for seizures like carbamazepine, oxcarbazepine, phenobarbital, phenytoin, primidone  -cimetidine  -cyclosporine  -female hormones, like estrogens or progestins and birth control pills, patches, rings, or injections  -general anesthetics like halothane, isoflurane, methoxyflurane, propofol  -grapefruit juice  -local anesthetics like lidocaine, pramoxine, tetracaine  -medicines that relax muscles for surgery  -narcotic medicines for pain  -other antiviral medicines for HIV or AIDS  -phenothiazines like chlorpromazine, mesoridazine, prochlorperazine, thioridazine  This list may not describe all possible interactions. Give your health care provider a list of all the medicines, herbs, non-prescription drugs, or dietary supplements you use. Also tell them if you smoke, drink alcohol, or use illegal drugs. Some items may interact with your medicine.  What should I watch for while using this medicine?  Tell your doctor or health care professional if your symptoms do not start to get better or if they get worse.  Do not stop taking except on your doctor's advice. You may develop a severe reaction. Your doctor will tell  you how much medicine to take.  You may get drowsy or dizzy. Do not drive, use machinery, or do anything that needs mental alertness until you know how this medicine affects you. To reduce the risk of dizzy and fainting spells, do not stand or sit up quickly, especially if you are an older patient. Alcohol may increase dizziness and drowsiness. Avoid alcoholic drinks.  If you are taking another medicine that also causes drowsiness, you may have more side effects. Give your health care provider a list of all medicines you use. Your doctor will tell you how much medicine to take. Do not take more medicine than directed. Call emergency for help if you have problems breathing or unusual sleepiness.  What side effects may I notice from receiving this medicine?  Side effects that you should report to your doctor or health care professional as soon as possible:  -allergic reactions like skin rash, itching or hives, swelling of the face, lips, or tongue  -breathing problems  -confusion  -loss of balance or coordination  -signs and symptoms of low blood pressure like dizziness; feeling faint or lightheaded, falls; unusually weak or tired  -suicidal thoughts or other mood changes  Side effects that usually do not require medical attention (report to your doctor or health care professional if they continue or are bothersome):  -dizziness  -dry mouth  -nausea, vomiting  -tiredness  This list may not describe all possible side effects. Call your doctor for medical advice about side effects. You may report side effects to FDA at 2-322-FDA-4219.  Where should I keep my medicine?  Keep out of the reach of children. This medicine can be abused. Keep your medicine in a safe place to protect it from theft. Do not share this medicine with anyone. Selling or giving away this medicine is dangerous and against the law.  Store at room temperature between 20 and 25 degrees C (68 and 77 degrees F). This medicine may cause accidental overdose  and death if taken by other adults, children, or pets. Mix any unused medicine with a substance like cat litter or coffee grounds. Then throw the medicine away in a sealed container like a sealed bag or a coffee can with a lid. Do not use the medicine after the expiration date.  NOTE: This sheet is a summary. It may not cover all possible information. If you have questions about this medicine, talk to your doctor, pharmacist, or health care provider.  © 2018 [employer] Standard (2016-09-15 13:47:25)

## 2020-03-25 NOTE — PROGRESS NOTES
Discharge instructions given to patient at bedside, verbalizes understanding and states plans for follow-up with PCPas recommended. Individualized instruction and CHF discharge information provided on new medications and worsening of symptoms needing follow-up care. Telemetry monitor removed, monitor room notified. IV cathlon removed. All belongings accounted for, all questions answered at this time. Discharged to home with relative.

## 2020-03-25 NOTE — PROGRESS NOTES
Patient back from stress test and requesting food at this time. Paged Dr. Thomas, order received via telephone with read back to resume patient's low fiber GI soft diet.

## 2020-03-25 NOTE — DISCHARGE PLANNING
Medicaid Meds-to-Beds: Discharge prescription orders listed below delivered to patient's bedside. RN notified. Patient counseled.    Patient requested refill of her BP medication as she states she ran out. She requested this to be sent to her [facility] in [place]. Discussed patient concerns with provider.         Alan Penaloza   Home Medication Instructions ANIKA:[id number]    Printed on:03/25/20 1300   Medication Information                      azithromycin (ZITHROMAX) 500 MG tablet  Take 1 Tab by mouth every day.             Guaifenesin 1200 MG TABLET SR 12 HR  Take 1 Tab by mouth 2 Times a Day.             omeprazole (PRILOSEC) 20 MG delayed-release capsule  Take 1 Cap by mouth every day.               Rhonda Abrams, PharmD
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
## 2020-03-25 NOTE — PROGRESS NOTES
Patient is extremely anxious and panicked, Dr. Payne notified. Received order for one time dose of xanax.

## 2020-05-05 ENCOUNTER — APPOINTMENT (OUTPATIENT)
Dept: RADIOLOGY | Facility: MEDICAL CENTER | Age: 40
End: 2020-05-05
Attending: PHYSICIAN ASSISTANT
Payer: MEDICAID

## 2020-05-08 ENCOUNTER — APPOINTMENT (OUTPATIENT)
Dept: RADIOLOGY | Facility: MEDICAL CENTER | Age: 40
End: 2020-05-08
Attending: PHYSICIAN ASSISTANT
Payer: MEDICAID

## 2020-05-12 ENCOUNTER — HOSPITAL ENCOUNTER (OUTPATIENT)
Dept: RADIOLOGY | Facility: MEDICAL CENTER | Age: 40
End: 2020-05-12
Attending: PHYSICIAN ASSISTANT
Payer: MEDICAID

## 2020-05-12 DIAGNOSIS — M54.12 RADICULOPATHY, CERVICAL REGION: ICD-10-CM

## 2020-05-12 DIAGNOSIS — M25.511 RIGHT SHOULDER PAIN, UNSPECIFIED CHRONICITY: ICD-10-CM

## 2020-05-12 PROCEDURE — 73030 X-RAY EXAM OF SHOULDER: CPT | Mod: RT

## 2020-05-12 PROCEDURE — 72141 MRI NECK SPINE W/O DYE: CPT

## 2024-04-04 ENCOUNTER — HOSPITAL ENCOUNTER (OUTPATIENT)
Facility: MEDICAL CENTER | Age: 44
End: 2024-04-06
Attending: STUDENT IN AN ORGANIZED HEALTH CARE EDUCATION/TRAINING PROGRAM | Admitting: HOSPITALIST
Payer: COMMERCIAL

## 2024-04-04 ENCOUNTER — APPOINTMENT (OUTPATIENT)
Dept: RADIOLOGY | Facility: MEDICAL CENTER | Age: 44
End: 2024-04-04
Attending: STUDENT IN AN ORGANIZED HEALTH CARE EDUCATION/TRAINING PROGRAM
Payer: COMMERCIAL

## 2024-04-04 DIAGNOSIS — R07.9 CHEST PAIN, UNSPECIFIED TYPE: ICD-10-CM

## 2024-04-04 DIAGNOSIS — I10 PRIMARY HYPERTENSION: ICD-10-CM

## 2024-04-04 DIAGNOSIS — G89.21 CHRONIC PAIN AFTER TRAUMATIC INJURY: ICD-10-CM

## 2024-04-04 DIAGNOSIS — R94.31 EKG ABNORMALITY: ICD-10-CM

## 2024-04-04 DIAGNOSIS — R42 DIZZINESS: ICD-10-CM

## 2024-04-04 DIAGNOSIS — E87.6 HYPOKALEMIA: ICD-10-CM

## 2024-04-04 DIAGNOSIS — I16.0 HYPERTENSIVE URGENCY: ICD-10-CM

## 2024-04-04 DIAGNOSIS — M94.0 COSTOCHONDRITIS: ICD-10-CM

## 2024-04-04 LAB
EKG IMPRESSION: NORMAL
EKG IMPRESSION: NORMAL
GLUCOSE BLD STRIP.AUTO-MCNC: 135 MG/DL (ref 65–99)

## 2024-04-04 PROCEDURE — 85730 THROMBOPLASTIN TIME PARTIAL: CPT

## 2024-04-04 PROCEDURE — 85610 PROTHROMBIN TIME: CPT

## 2024-04-04 PROCEDURE — 700111 HCHG RX REV CODE 636 W/ 250 OVERRIDE (IP): Mod: JZ,UD | Performed by: STUDENT IN AN ORGANIZED HEALTH CARE EDUCATION/TRAINING PROGRAM

## 2024-04-04 PROCEDURE — 700102 HCHG RX REV CODE 250 W/ 637 OVERRIDE(OP): Performed by: STUDENT IN AN ORGANIZED HEALTH CARE EDUCATION/TRAINING PROGRAM

## 2024-04-04 PROCEDURE — 85025 COMPLETE CBC W/AUTO DIFF WBC: CPT

## 2024-04-04 PROCEDURE — A9270 NON-COVERED ITEM OR SERVICE: HCPCS | Performed by: STUDENT IN AN ORGANIZED HEALTH CARE EDUCATION/TRAINING PROGRAM

## 2024-04-04 PROCEDURE — 93005 ELECTROCARDIOGRAM TRACING: CPT | Performed by: STUDENT IN AN ORGANIZED HEALTH CARE EDUCATION/TRAINING PROGRAM

## 2024-04-04 PROCEDURE — 84703 CHORIONIC GONADOTROPIN ASSAY: CPT

## 2024-04-04 PROCEDURE — 96374 THER/PROPH/DIAG INJ IV PUSH: CPT

## 2024-04-04 PROCEDURE — 71045 X-RAY EXAM CHEST 1 VIEW: CPT

## 2024-04-04 PROCEDURE — 85379 FIBRIN DEGRADATION QUANT: CPT

## 2024-04-04 PROCEDURE — 99285 EMERGENCY DEPT VISIT HI MDM: CPT

## 2024-04-04 PROCEDURE — 82962 GLUCOSE BLOOD TEST: CPT

## 2024-04-04 PROCEDURE — 84484 ASSAY OF TROPONIN QUANT: CPT

## 2024-04-04 PROCEDURE — 93005 ELECTROCARDIOGRAM TRACING: CPT

## 2024-04-04 PROCEDURE — 80053 COMPREHEN METABOLIC PANEL: CPT

## 2024-04-04 PROCEDURE — 96375 TX/PRO/DX INJ NEW DRUG ADDON: CPT

## 2024-04-04 RX ORDER — SODIUM CHLORIDE 9 MG/ML
1000 INJECTION, SOLUTION INTRAVENOUS ONCE
Status: COMPLETED | OUTPATIENT
Start: 2024-04-05 | End: 2024-04-05

## 2024-04-04 RX ORDER — MORPHINE SULFATE 4 MG/ML
4 INJECTION INTRAVENOUS ONCE
Status: COMPLETED | OUTPATIENT
Start: 2024-04-05 | End: 2024-04-04

## 2024-04-04 RX ORDER — MECLIZINE HYDROCHLORIDE 25 MG/1
25 TABLET ORAL ONCE
Status: COMPLETED | OUTPATIENT
Start: 2024-04-05 | End: 2024-04-04

## 2024-04-04 RX ORDER — ONDANSETRON 2 MG/ML
4 INJECTION INTRAMUSCULAR; INTRAVENOUS ONCE
Status: COMPLETED | OUTPATIENT
Start: 2024-04-05 | End: 2024-04-04

## 2024-04-04 RX ADMIN — MECLIZINE HYDROCHLORIDE 25 MG: 25 TABLET ORAL at 23:53

## 2024-04-04 RX ADMIN — MORPHINE SULFATE 4 MG: 4 INJECTION INTRAVENOUS at 23:53

## 2024-04-04 RX ADMIN — ONDANSETRON 4 MG: 2 INJECTION INTRAMUSCULAR; INTRAVENOUS at 23:54

## 2024-04-05 ENCOUNTER — APPOINTMENT (OUTPATIENT)
Dept: RADIOLOGY | Facility: MEDICAL CENTER | Age: 44
End: 2024-04-05
Attending: HOSPITALIST
Payer: COMMERCIAL

## 2024-04-05 ENCOUNTER — APPOINTMENT (OUTPATIENT)
Dept: CARDIOLOGY | Facility: MEDICAL CENTER | Age: 44
End: 2024-04-05
Attending: HOSPITALIST
Payer: COMMERCIAL

## 2024-04-05 ENCOUNTER — APPOINTMENT (OUTPATIENT)
Dept: RADIOLOGY | Facility: MEDICAL CENTER | Age: 44
End: 2024-04-05
Payer: COMMERCIAL

## 2024-04-05 ENCOUNTER — APPOINTMENT (OUTPATIENT)
Dept: RADIOLOGY | Facility: MEDICAL CENTER | Age: 44
End: 2024-04-05
Attending: STUDENT IN AN ORGANIZED HEALTH CARE EDUCATION/TRAINING PROGRAM
Payer: COMMERCIAL

## 2024-04-05 PROBLEM — O03.9 MISCARRIAGE: Status: ACTIVE | Noted: 2024-04-05

## 2024-04-05 PROBLEM — I16.0 HYPERTENSIVE URGENCY: Status: ACTIVE | Noted: 2024-04-05

## 2024-04-05 PROBLEM — I20.89 STABLE ANGINA (HCC): Status: ACTIVE | Noted: 2024-04-05

## 2024-04-05 LAB
ABO GROUP BLD: NORMAL
ALBUMIN SERPL BCP-MCNC: 4.7 G/DL (ref 3.2–4.9)
ALBUMIN/GLOB SERPL: 1.5 G/DL
ALP SERPL-CCNC: 70 U/L (ref 30–99)
ALT SERPL-CCNC: 21 U/L (ref 2–50)
ANION GAP SERPL CALC-SCNC: 14 MMOL/L (ref 7–16)
APPEARANCE UR: CLEAR
APTT PPP: 28.5 SEC (ref 24.7–36)
AST SERPL-CCNC: 11 U/L (ref 12–45)
BASOPHILS # BLD AUTO: 0.5 % (ref 0–1.8)
BASOPHILS # BLD: 0.09 K/UL (ref 0–0.12)
BILIRUB SERPL-MCNC: 0.4 MG/DL (ref 0.1–1.5)
BILIRUB UR QL STRIP.AUTO: NEGATIVE
BLD GP AB SCN SERPL QL: NORMAL
BUN SERPL-MCNC: 16 MG/DL (ref 8–22)
CALCIUM ALBUM COR SERPL-MCNC: 9.3 MG/DL (ref 8.5–10.5)
CALCIUM SERPL-MCNC: 9.9 MG/DL (ref 8.5–10.5)
CHLORIDE SERPL-SCNC: 102 MMOL/L (ref 96–112)
CHOLEST SERPL-MCNC: 206 MG/DL (ref 100–199)
CO2 SERPL-SCNC: 23 MMOL/L (ref 20–33)
COLOR UR: YELLOW
CREAT SERPL-MCNC: 1.09 MG/DL (ref 0.5–1.4)
D DIMER PPP IA.FEU-MCNC: <0.27 UG/ML (FEU) (ref 0–0.5)
D DIMER PPP IA.FEU-MCNC: <0.27 UG/ML (FEU) (ref 0–0.5)
EKG IMPRESSION: NORMAL
EKG IMPRESSION: NORMAL
EOSINOPHIL # BLD AUTO: 0.12 K/UL (ref 0–0.51)
EOSINOPHIL NFR BLD: 0.7 % (ref 0–6.9)
ERYTHROCYTE [DISTWIDTH] IN BLOOD BY AUTOMATED COUNT: 41.2 FL (ref 35.9–50)
EST. AVERAGE GLUCOSE BLD GHB EST-MCNC: 108 MG/DL
FLUAV RNA SPEC QL NAA+PROBE: NEGATIVE
FLUBV RNA SPEC QL NAA+PROBE: NEGATIVE
GFR SERPLBLD CREATININE-BSD FMLA CKD-EPI: 64 ML/MIN/1.73 M 2
GLOBULIN SER CALC-MCNC: 3.2 G/DL (ref 1.9–3.5)
GLUCOSE SERPL-MCNC: 137 MG/DL (ref 65–99)
GLUCOSE UR STRIP.AUTO-MCNC: NEGATIVE MG/DL
HBA1C MFR BLD: 5.4 % (ref 4–5.6)
HCG SERPL QL: NEGATIVE
HCG SERPL QL: NEGATIVE
HCT VFR BLD AUTO: 42 % (ref 37–47)
HDLC SERPL-MCNC: 49 MG/DL
HGB BLD-MCNC: 14.2 G/DL (ref 12–16)
IMM GRANULOCYTES # BLD AUTO: 0.09 K/UL (ref 0–0.11)
IMM GRANULOCYTES NFR BLD AUTO: 0.5 % (ref 0–0.9)
INR PPP: 1.1 (ref 0.87–1.13)
KETONES UR STRIP.AUTO-MCNC: NEGATIVE MG/DL
LDLC SERPL CALC-MCNC: 142 MG/DL
LEUKOCYTE ESTERASE UR QL STRIP.AUTO: NEGATIVE
LV EJECT FRACT  99904: 65
LV EJECT FRACT MOD 2C 99903: 58.19
LV EJECT FRACT MOD 4C 99902: 61.7
LV EJECT FRACT MOD BP 99901: 57.68
LYMPHOCYTES # BLD AUTO: 2.74 K/UL (ref 1–4.8)
LYMPHOCYTES NFR BLD: 15 % (ref 22–41)
MAGNESIUM SERPL-MCNC: 2.2 MG/DL (ref 1.5–2.5)
MCH RBC QN AUTO: 29 PG (ref 27–33)
MCHC RBC AUTO-ENTMCNC: 33.8 G/DL (ref 32.2–35.5)
MCV RBC AUTO: 85.9 FL (ref 81.4–97.8)
MICRO URNS: ABNORMAL
MONOCYTES # BLD AUTO: 1.4 K/UL (ref 0–0.85)
MONOCYTES NFR BLD AUTO: 7.6 % (ref 0–13.4)
NEUTROPHILS # BLD AUTO: 13.87 K/UL (ref 1.82–7.42)
NEUTROPHILS NFR BLD: 75.7 % (ref 44–72)
NITRITE UR QL STRIP.AUTO: NEGATIVE
NRBC # BLD AUTO: 0 K/UL
NRBC BLD-RTO: 0 /100 WBC (ref 0–0.2)
PH UR STRIP.AUTO: 6 [PH] (ref 5–8)
PLATELET # BLD AUTO: 467 K/UL (ref 164–446)
PMV BLD AUTO: 8.6 FL (ref 9–12.9)
POTASSIUM SERPL-SCNC: 3.2 MMOL/L (ref 3.6–5.5)
PROCALCITONIN SERPL-MCNC: <0.05 NG/ML
PROT SERPL-MCNC: 7.9 G/DL (ref 6–8.2)
PROT UR QL STRIP: NEGATIVE MG/DL
PROTHROMBIN TIME: 14.3 SEC (ref 12–14.6)
RBC # BLD AUTO: 4.89 M/UL (ref 4.2–5.4)
RBC UR QL AUTO: NEGATIVE
RH BLD: NORMAL
RSV RNA SPEC QL NAA+PROBE: NEGATIVE
SARS-COV-2 RNA RESP QL NAA+PROBE: NOTDETECTED
SODIUM SERPL-SCNC: 139 MMOL/L (ref 135–145)
SP GR UR STRIP.AUTO: >=1.045
TRIGL SERPL-MCNC: 75 MG/DL (ref 0–149)
TROPONIN T SERPL-MCNC: 10 NG/L (ref 6–19)
TROPONIN T SERPL-MCNC: 7 NG/L (ref 6–19)
TROPONIN T SERPL-MCNC: 7 NG/L (ref 6–19)
TROPONIN T SERPL-MCNC: <6 NG/L (ref 6–19)
TSH SERPL DL<=0.005 MIU/L-ACNC: 0.37 UIU/ML (ref 0.38–5.33)
UROBILINOGEN UR STRIP.AUTO-MCNC: 1 MG/DL
WBC # BLD AUTO: 18.3 K/UL (ref 4.8–10.8)

## 2024-04-05 PROCEDURE — 84443 ASSAY THYROID STIM HORMONE: CPT

## 2024-04-05 PROCEDURE — 0241U HCHG SARS-COV-2 COVID-19 NFCT DS RESP RNA 4 TRGT ED POC: CPT

## 2024-04-05 PROCEDURE — 83735 ASSAY OF MAGNESIUM: CPT

## 2024-04-05 PROCEDURE — 93005 ELECTROCARDIOGRAM TRACING: CPT

## 2024-04-05 PROCEDURE — 74177 CT ABD & PELVIS W/CONTRAST: CPT

## 2024-04-05 PROCEDURE — 96376 TX/PRO/DX INJ SAME DRUG ADON: CPT

## 2024-04-05 PROCEDURE — 99223 1ST HOSP IP/OBS HIGH 75: CPT | Performed by: HOSPITALIST

## 2024-04-05 PROCEDURE — 93010 ELECTROCARDIOGRAM REPORT: CPT | Performed by: INTERNAL MEDICINE

## 2024-04-05 PROCEDURE — 96375 TX/PRO/DX INJ NEW DRUG ADDON: CPT

## 2024-04-05 PROCEDURE — 700102 HCHG RX REV CODE 250 W/ 637 OVERRIDE(OP)

## 2024-04-05 PROCEDURE — G0378 HOSPITAL OBSERVATION PER HR: HCPCS

## 2024-04-05 PROCEDURE — 86850 RBC ANTIBODY SCREEN: CPT

## 2024-04-05 PROCEDURE — 84703 CHORIONIC GONADOTROPIN ASSAY: CPT

## 2024-04-05 PROCEDURE — 93010 ELECTROCARDIOGRAM REPORT: CPT | Performed by: STUDENT IN AN ORGANIZED HEALTH CARE EDUCATION/TRAINING PROGRAM

## 2024-04-05 PROCEDURE — A9270 NON-COVERED ITEM OR SERVICE: HCPCS

## 2024-04-05 PROCEDURE — 84484 ASSAY OF TROPONIN QUANT: CPT | Mod: 91

## 2024-04-05 PROCEDURE — 700117 HCHG RX CONTRAST REV CODE 255: Mod: UD | Performed by: HOSPITALIST

## 2024-04-05 PROCEDURE — 93306 TTE W/DOPPLER COMPLETE: CPT

## 2024-04-05 PROCEDURE — 83036 HEMOGLOBIN GLYCOSYLATED A1C: CPT

## 2024-04-05 PROCEDURE — 81003 URINALYSIS AUTO W/O SCOPE: CPT

## 2024-04-05 PROCEDURE — 93306 TTE W/DOPPLER COMPLETE: CPT | Mod: 26 | Performed by: INTERNAL MEDICINE

## 2024-04-05 PROCEDURE — 700105 HCHG RX REV CODE 258: Performed by: STUDENT IN AN ORGANIZED HEALTH CARE EDUCATION/TRAINING PROGRAM

## 2024-04-05 PROCEDURE — 80061 LIPID PANEL: CPT

## 2024-04-05 PROCEDURE — 85379 FIBRIN DEGRADATION QUANT: CPT

## 2024-04-05 PROCEDURE — 99285 EMERGENCY DEPT VISIT HI MDM: CPT

## 2024-04-05 PROCEDURE — A9270 NON-COVERED ITEM OR SERVICE: HCPCS | Mod: UD | Performed by: HOSPITALIST

## 2024-04-05 PROCEDURE — 84145 PROCALCITONIN (PCT): CPT

## 2024-04-05 PROCEDURE — 96374 THER/PROPH/DIAG INJ IV PUSH: CPT

## 2024-04-05 PROCEDURE — 700111 HCHG RX REV CODE 636 W/ 250 OVERRIDE (IP)

## 2024-04-05 PROCEDURE — 36415 COLL VENOUS BLD VENIPUNCTURE: CPT

## 2024-04-05 PROCEDURE — 86901 BLOOD TYPING SEROLOGIC RH(D): CPT

## 2024-04-05 PROCEDURE — 86900 BLOOD TYPING SEROLOGIC ABO: CPT

## 2024-04-05 PROCEDURE — 700102 HCHG RX REV CODE 250 W/ 637 OVERRIDE(OP): Mod: JZ,UD | Performed by: STUDENT IN AN ORGANIZED HEALTH CARE EDUCATION/TRAINING PROGRAM

## 2024-04-05 PROCEDURE — 700111 HCHG RX REV CODE 636 W/ 250 OVERRIDE (IP): Mod: JZ | Performed by: HOSPITALIST

## 2024-04-05 PROCEDURE — A9270 NON-COVERED ITEM OR SERVICE: HCPCS | Mod: JZ,UD | Performed by: STUDENT IN AN ORGANIZED HEALTH CARE EDUCATION/TRAINING PROGRAM

## 2024-04-05 PROCEDURE — 70450 CT HEAD/BRAIN W/O DYE: CPT

## 2024-04-05 PROCEDURE — 93005 ELECTROCARDIOGRAM TRACING: CPT | Performed by: HOSPITALIST

## 2024-04-05 PROCEDURE — 700102 HCHG RX REV CODE 250 W/ 637 OVERRIDE(OP): Mod: UD | Performed by: HOSPITALIST

## 2024-04-05 PROCEDURE — 700111 HCHG RX REV CODE 636 W/ 250 OVERRIDE (IP): Mod: JZ,UD | Performed by: STUDENT IN AN ORGANIZED HEALTH CARE EDUCATION/TRAINING PROGRAM

## 2024-04-05 RX ORDER — OXYCODONE HYDROCHLORIDE 5 MG/1
5 TABLET ORAL EVERY 4 HOURS PRN
Status: DISCONTINUED | OUTPATIENT
Start: 2024-04-05 | End: 2024-04-06 | Stop reason: HOSPADM

## 2024-04-05 RX ORDER — METHYLPREDNISOLONE SODIUM SUCCINATE 125 MG/2ML
125 INJECTION, POWDER, LYOPHILIZED, FOR SOLUTION INTRAMUSCULAR; INTRAVENOUS ONCE
Status: COMPLETED | OUTPATIENT
Start: 2024-04-05 | End: 2024-04-05

## 2024-04-05 RX ORDER — DIPHENHYDRAMINE HYDROCHLORIDE 50 MG/ML
25 INJECTION INTRAMUSCULAR; INTRAVENOUS ONCE
Status: DISCONTINUED | OUTPATIENT
Start: 2024-04-05 | End: 2024-04-05

## 2024-04-05 RX ORDER — ALPRAZOLAM 0.25 MG/1
0.25 TABLET ORAL ONCE
Status: COMPLETED | OUTPATIENT
Start: 2024-04-05 | End: 2024-04-05

## 2024-04-05 RX ORDER — NICOTINE 21 MG/24HR
21 PATCH, TRANSDERMAL 24 HOURS TRANSDERMAL
Status: DISCONTINUED | OUTPATIENT
Start: 2024-04-06 | End: 2024-04-06 | Stop reason: HOSPADM

## 2024-04-05 RX ORDER — HYDROMORPHONE HYDROCHLORIDE 1 MG/ML
1 INJECTION, SOLUTION INTRAMUSCULAR; INTRAVENOUS; SUBCUTANEOUS EVERY 4 HOURS PRN
Status: DISCONTINUED | OUTPATIENT
Start: 2024-04-05 | End: 2024-04-06 | Stop reason: HOSPADM

## 2024-04-05 RX ORDER — HYDRALAZINE HYDROCHLORIDE 20 MG/ML
20 INJECTION INTRAMUSCULAR; INTRAVENOUS EVERY 6 HOURS PRN
Status: DISCONTINUED | OUTPATIENT
Start: 2024-04-05 | End: 2024-04-06 | Stop reason: HOSPADM

## 2024-04-05 RX ORDER — PROPRANOLOL HYDROCHLORIDE 20 MG/1
20 TABLET ORAL 2 TIMES DAILY
Status: DISCONTINUED | OUTPATIENT
Start: 2024-04-05 | End: 2024-04-06 | Stop reason: HOSPADM

## 2024-04-05 RX ORDER — TRAMADOL HYDROCHLORIDE 50 MG/1
50 TABLET ORAL ONCE
Status: DISCONTINUED | OUTPATIENT
Start: 2024-04-05 | End: 2024-04-05

## 2024-04-05 RX ORDER — LISINOPRIL 20 MG/1
40 TABLET ORAL 2 TIMES DAILY
Status: DISCONTINUED | OUTPATIENT
Start: 2024-04-05 | End: 2024-04-05

## 2024-04-05 RX ORDER — ONDANSETRON 4 MG/1
4 TABLET, ORALLY DISINTEGRATING ORAL EVERY 4 HOURS PRN
Status: DISCONTINUED | OUTPATIENT
Start: 2024-04-05 | End: 2024-04-06 | Stop reason: HOSPADM

## 2024-04-05 RX ORDER — MORPHINE SULFATE 30 MG/1
30 TABLET, FILM COATED, EXTENDED RELEASE ORAL 3 TIMES DAILY
Status: DISCONTINUED | OUTPATIENT
Start: 2024-04-05 | End: 2024-04-06 | Stop reason: HOSPADM

## 2024-04-05 RX ORDER — NICOTINE 21 MG/24HR
21 PATCH, TRANSDERMAL 24 HOURS TRANSDERMAL ONCE
Status: COMPLETED | OUTPATIENT
Start: 2024-04-05 | End: 2024-04-06

## 2024-04-05 RX ORDER — ASPIRIN 81 MG/1
81 TABLET ORAL DAILY
Status: DISCONTINUED | OUTPATIENT
Start: 2024-04-05 | End: 2024-04-06 | Stop reason: HOSPADM

## 2024-04-05 RX ORDER — PROPRANOLOL HYDROCHLORIDE 20 MG/1
20 TABLET ORAL 2 TIMES DAILY
Status: ON HOLD | COMMUNITY
End: 2024-04-06

## 2024-04-05 RX ORDER — LABETALOL HYDROCHLORIDE 5 MG/ML
10 INJECTION, SOLUTION INTRAVENOUS EVERY 4 HOURS PRN
Status: DISCONTINUED | OUTPATIENT
Start: 2024-04-05 | End: 2024-04-06 | Stop reason: HOSPADM

## 2024-04-05 RX ORDER — MORPHINE SULFATE 4 MG/ML
2 INJECTION INTRAVENOUS EVERY 4 HOURS PRN
Status: DISCONTINUED | OUTPATIENT
Start: 2024-04-05 | End: 2024-04-05

## 2024-04-05 RX ORDER — REGADENOSON 0.08 MG/ML
0.4 INJECTION, SOLUTION INTRAVENOUS
Status: DISCONTINUED | OUTPATIENT
Start: 2024-04-05 | End: 2024-04-06 | Stop reason: HOSPADM

## 2024-04-05 RX ORDER — HYDROCHLOROTHIAZIDE 25 MG/1
25 TABLET ORAL DAILY
Status: DISCONTINUED | OUTPATIENT
Start: 2024-04-05 | End: 2024-04-06 | Stop reason: HOSPADM

## 2024-04-05 RX ORDER — MORPHINE SULFATE 4 MG/ML
2 INJECTION INTRAVENOUS ONCE
Status: COMPLETED | OUTPATIENT
Start: 2024-04-05 | End: 2024-04-05

## 2024-04-05 RX ORDER — ACETAMINOPHEN 325 MG/1
650 TABLET ORAL EVERY 6 HOURS PRN
Status: DISCONTINUED | OUTPATIENT
Start: 2024-04-05 | End: 2024-04-06 | Stop reason: HOSPADM

## 2024-04-05 RX ORDER — MORPHINE SULFATE 30 MG/1
30 TABLET, FILM COATED, EXTENDED RELEASE ORAL 3 TIMES DAILY
Status: ON HOLD | COMMUNITY
Start: 2024-03-08 | End: 2024-04-06

## 2024-04-05 RX ORDER — NAPROXEN SODIUM 550 MG/1
550-1100 TABLET ORAL 3 TIMES DAILY PRN
COMMUNITY

## 2024-04-05 RX ORDER — ONDANSETRON 2 MG/ML
4 INJECTION INTRAMUSCULAR; INTRAVENOUS EVERY 4 HOURS PRN
Status: DISCONTINUED | OUTPATIENT
Start: 2024-04-05 | End: 2024-04-06 | Stop reason: HOSPADM

## 2024-04-05 RX ORDER — NITROGLYCERIN 0.4 MG/1
0.4 TABLET SUBLINGUAL
Status: DISCONTINUED | OUTPATIENT
Start: 2024-04-05 | End: 2024-04-06 | Stop reason: HOSPADM

## 2024-04-05 RX ORDER — METHYLPREDNISOLONE SODIUM SUCCINATE 40 MG/ML
20 INJECTION, POWDER, LYOPHILIZED, FOR SOLUTION INTRAMUSCULAR; INTRAVENOUS ONCE
Status: DISCONTINUED | OUTPATIENT
Start: 2024-04-05 | End: 2024-04-05

## 2024-04-05 RX ORDER — POTASSIUM CHLORIDE 20 MEQ/1
40 TABLET, EXTENDED RELEASE ORAL ONCE
Status: COMPLETED | OUTPATIENT
Start: 2024-04-05 | End: 2024-04-05

## 2024-04-05 RX ORDER — DIPHENHYDRAMINE HYDROCHLORIDE 50 MG/ML
50 INJECTION INTRAMUSCULAR; INTRAVENOUS ONCE
Status: COMPLETED | OUTPATIENT
Start: 2024-04-05 | End: 2024-04-05

## 2024-04-05 RX ORDER — POTASSIUM CHLORIDE 20 MEQ/1
40 TABLET, EXTENDED RELEASE ORAL ONCE
Status: DISCONTINUED | OUTPATIENT
Start: 2024-04-05 | End: 2024-04-05

## 2024-04-05 RX ORDER — LISINOPRIL 20 MG/1
20 TABLET ORAL 2 TIMES DAILY
Status: DISCONTINUED | OUTPATIENT
Start: 2024-04-05 | End: 2024-04-06 | Stop reason: HOSPADM

## 2024-04-05 RX ORDER — CAFFEINE CITRATE 20 MG/ML
60 SOLUTION INTRAVENOUS
Status: DISCONTINUED | OUTPATIENT
Start: 2024-04-05 | End: 2024-04-06 | Stop reason: HOSPADM

## 2024-04-05 RX ORDER — OXYCODONE AND ACETAMINOPHEN 10; 325 MG/1; MG/1
.5-1 TABLET ORAL 3 TIMES DAILY
Status: ON HOLD | COMMUNITY
Start: 2024-03-08 | End: 2024-04-06

## 2024-04-05 RX ORDER — CLONIDINE HYDROCHLORIDE 0.1 MG/1
0.1 TABLET ORAL EVERY 6 HOURS PRN
Status: DISCONTINUED | OUTPATIENT
Start: 2024-04-05 | End: 2024-04-06 | Stop reason: HOSPADM

## 2024-04-05 RX ORDER — LISINOPRIL 20 MG/1
20 TABLET ORAL 2 TIMES DAILY
Status: ON HOLD | COMMUNITY
End: 2024-04-06

## 2024-04-05 RX ORDER — HYDROCHLOROTHIAZIDE 25 MG/1
25 TABLET ORAL DAILY
Status: ON HOLD | COMMUNITY
End: 2024-04-06

## 2024-04-05 RX ORDER — VERAPAMIL HYDROCHLORIDE 120 MG/1
120 CAPSULE, EXTENDED RELEASE ORAL EVERY EVENING
Status: DISCONTINUED | OUTPATIENT
Start: 2024-04-05 | End: 2024-04-06 | Stop reason: HOSPADM

## 2024-04-05 RX ORDER — LISINOPRIL 20 MG/1
20 TABLET ORAL 2 TIMES DAILY
Status: DISCONTINUED | OUTPATIENT
Start: 2024-04-05 | End: 2024-04-05

## 2024-04-05 RX ADMIN — FENTANYL CITRATE 50 MCG: 50 INJECTION, SOLUTION INTRAMUSCULAR; INTRAVENOUS at 01:36

## 2024-04-05 RX ADMIN — PROPRANOLOL HYDROCHLORIDE 20 MG: 20 TABLET ORAL at 17:50

## 2024-04-05 RX ADMIN — DIPHENHYDRAMINE HYDROCHLORIDE 50 MG: 50 INJECTION, SOLUTION INTRAMUSCULAR; INTRAVENOUS at 04:00

## 2024-04-05 RX ADMIN — MORPHINE SULFATE 30 MG: 30 TABLET, FILM COATED, EXTENDED RELEASE ORAL at 20:27

## 2024-04-05 RX ADMIN — SODIUM CHLORIDE 1000 ML: 9 INJECTION, SOLUTION INTRAVENOUS at 00:00

## 2024-04-05 RX ADMIN — MORPHINE SULFATE 2 MG: 4 INJECTION, SOLUTION INTRAMUSCULAR; INTRAVENOUS at 11:16

## 2024-04-05 RX ADMIN — LISINOPRIL 20 MG: 20 TABLET ORAL at 17:50

## 2024-04-05 RX ADMIN — IOHEXOL 100 ML: 350 INJECTION, SOLUTION INTRAVENOUS at 04:00

## 2024-04-05 RX ADMIN — ALPRAZOLAM 0.25 MG: 0.25 TABLET ORAL at 18:19

## 2024-04-05 RX ADMIN — HYDRALAZINE HYDROCHLORIDE 20 MG: 20 INJECTION, SOLUTION INTRAMUSCULAR; INTRAVENOUS at 16:37

## 2024-04-05 RX ADMIN — POTASSIUM CHLORIDE 40 MEQ: 1500 TABLET, EXTENDED RELEASE ORAL at 02:49

## 2024-04-05 RX ADMIN — VERAPAMIL HYDROCHLORIDE 120 MG: 120 CAPSULE, DELAYED RELEASE PELLETS ORAL at 14:52

## 2024-04-05 RX ADMIN — PROPRANOLOL HYDROCHLORIDE 20 MG: 20 TABLET ORAL at 05:30

## 2024-04-05 RX ADMIN — HYDROMORPHONE HYDROCHLORIDE 1 MG: 1 INJECTION, SOLUTION INTRAMUSCULAR; INTRAVENOUS; SUBCUTANEOUS at 18:19

## 2024-04-05 RX ADMIN — ASPIRIN 81 MG: 81 TABLET, COATED ORAL at 04:10

## 2024-04-05 RX ADMIN — METHYLPREDNISOLONE SODIUM SUCCINATE 125 MG: 125 INJECTION, POWDER, FOR SOLUTION INTRAMUSCULAR; INTRAVENOUS at 04:00

## 2024-04-05 RX ADMIN — HYDROCHLOROTHIAZIDE 25 MG: 25 TABLET ORAL at 14:15

## 2024-04-05 RX ADMIN — OXYCODONE 5 MG: 5 TABLET ORAL at 04:10

## 2024-04-05 RX ADMIN — HYDROMORPHONE HYDROCHLORIDE 1 MG: 1 INJECTION, SOLUTION INTRAMUSCULAR; INTRAVENOUS; SUBCUTANEOUS at 14:24

## 2024-04-05 RX ADMIN — OXYCODONE 5 MG: 5 TABLET ORAL at 16:45

## 2024-04-05 RX ADMIN — MORPHINE SULFATE 30 MG: 30 TABLET, FILM COATED, EXTENDED RELEASE ORAL at 05:30

## 2024-04-05 RX ADMIN — LISINOPRIL 20 MG: 20 TABLET ORAL at 05:30

## 2024-04-05 RX ADMIN — MORPHINE SULFATE 30 MG: 30 TABLET, FILM COATED, EXTENDED RELEASE ORAL at 14:52

## 2024-04-05 RX ADMIN — NICOTINE TRANSDERMAL SYSTEM 21 MG: 21 PATCH, EXTENDED RELEASE TRANSDERMAL at 20:28

## 2024-04-05 ASSESSMENT — ENCOUNTER SYMPTOMS
DEPRESSION: 0
DOUBLE VISION: 0
COUGH: 0
EYE PAIN: 0
WEAKNESS: 0
WHEEZING: 0
SPUTUM PRODUCTION: 0
TINGLING: 0
NAUSEA: 1
PHOTOPHOBIA: 0
SHORTNESS OF BREATH: 0
HEARTBURN: 0
MYALGIAS: 0
BRUISES/BLEEDS EASILY: 0
VOMITING: 0
SORE THROAT: 0
NAUSEA: 0
PALPITATIONS: 0
FLANK PAIN: 0
NERVOUS/ANXIOUS: 0
NECK PAIN: 0
BACK PAIN: 1
DIZZINESS: 1
HEADACHES: 0
ORTHOPNEA: 0
DIARRHEA: 0
SINUS PAIN: 0
CONSTIPATION: 0
DIZZINESS: 0
SHORTNESS OF BREATH: 1
POLYDIPSIA: 0
STRIDOR: 0
FALLS: 0
HALLUCINATIONS: 0
HEMOPTYSIS: 0
BLURRED VISION: 0
FEVER: 0
CHILLS: 0
TREMORS: 0
BLOOD IN STOOL: 0
DIAPHORESIS: 0
CLAUDICATION: 0
ABDOMINAL PAIN: 0
PND: 0
BACK PAIN: 0

## 2024-04-05 ASSESSMENT — PAIN DESCRIPTION - PAIN TYPE
TYPE: ACUTE PAIN;CHRONIC PAIN
TYPE: ACUTE PAIN;CHRONIC PAIN
TYPE: ACUTE PAIN

## 2024-04-05 ASSESSMENT — LIFESTYLE VARIABLES
TOTAL SCORE: 0
CONSUMPTION TOTAL: NEGATIVE
SUBSTANCE_ABUSE: 0
TOTAL SCORE: 0
EVER HAD A DRINK FIRST THING IN THE MORNING TO STEADY YOUR NERVES TO GET RID OF A HANGOVER: NO
ALCOHOL_USE: NO
TOTAL SCORE: 0
HOW MANY TIMES IN THE PAST YEAR HAVE YOU HAD 5 OR MORE DRINKS IN A DAY: 0
HAVE YOU EVER FELT YOU SHOULD CUT DOWN ON YOUR DRINKING: NO
EVER FELT BAD OR GUILTY ABOUT YOUR DRINKING: NO
AVERAGE NUMBER OF DAYS PER WEEK YOU HAVE A DRINK CONTAINING ALCOHOL: 0
HAVE PEOPLE ANNOYED YOU BY CRITICIZING YOUR DRINKING: NO
ON A TYPICAL DAY WHEN YOU DRINK ALCOHOL HOW MANY DRINKS DO YOU HAVE: 0
DOES PATIENT WANT TO STOP DRINKING: CANNOT ASSESS
DO YOU DRINK ALCOHOL: NO

## 2024-04-05 ASSESSMENT — FIBROSIS 4 INDEX
FIB4 SCORE: 0.23

## 2024-04-05 ASSESSMENT — PATIENT HEALTH QUESTIONNAIRE - PHQ9
SUM OF ALL RESPONSES TO PHQ9 QUESTIONS 1 AND 2: 0
1. LITTLE INTEREST OR PLEASURE IN DOING THINGS: NOT AT ALL
2. FEELING DOWN, DEPRESSED, IRRITABLE, OR HOPELESS: NOT AT ALL
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
## 2024-04-05 NOTE — PROGRESS NOTES
Elizabeth RN received report from ER nurse. Elizabeth unable to take patient at this time, I will takeover as primary RN at this time. Received report. Admission profile completed.

## 2024-04-05 NOTE — CARE PLAN
The patient is Unstable - High likelihood or risk of patient condition declining or worsening    Shift Goals  Clinical Goals: ST and eco by 1300  Patient Goals: rest  Family Goals: DESIREE    Progress made toward(s) clinical / shift goals:    Problem: Knowledge Deficit - Standard  Goal: Patient and family/care givers will demonstrate understanding of plan of care, disease process/condition, diagnostic tests and medications  Description: Target End Date:  1-3 days or as soon as patient condition allows    Document in Patient Education    1.  Patient and family/caregiver oriented to unit, equipment, visitation policy and means for communicating concern  2.  Complete/review Learning Assessment  3.  Assess knowledge level of disease process/condition, treatment plan, diagnostic tests and medications  4.  Explain disease process/condition, treatment plan, diagnostic tests and medications  Outcome: Progressing     Problem: Pain - Standard  Goal: Alleviation of pain or a reduction in pain to the patient’s comfort goal  Description: Target End Date:  Prior to discharge or change in level of care    Document on Vitals flowsheet    1.  Document pain using the appropriate pain scale per order or unit policy  2.  Educate and implement non-pharmacologic comfort measures (i.e. relaxation, distraction, massage, cold/heat therapy, etc.)  3.  Pain management medications as ordered  4.  Reassess pain after pain med administration per policy  5.  If opiods administered assess patient's response to pain medication is appropriate per POSS sedation scale  6.  Follow pain management plan developed in collaboration with patient and interdisciplinary team (including palliative care or pain specialists if applicable)  Outcome: Progressing       Patient is not progressing towards the following goals:

## 2024-04-05 NOTE — ASSESSMENT & PLAN NOTE
Patient's blood pressure 170s to 190s  Restarted all home medications  IV hydralazine and clonidine as needed  As needed pain medication for symptomatic chest pain with hypertensive urgency

## 2024-04-05 NOTE — PROGRESS NOTES
Reported to Dr. Morales that patient experiencing ongoing chest pain, 7/10. Patient reports no dyspnea. Patient reports this is the same chest pain she has been having that brought her to the hospital.  Patient troponins are 10, 7, 7. Per dr. Morales, hold off on nitro at this time. Administered MS contin per order.

## 2024-04-05 NOTE — ASSESSMENT & PLAN NOTE
Echocardiogram completed showed EF of 65% with normal findings  Stress test canceled due to chest pain at the time the patient was taken down  At this time I have a higher suspicion for hypertensive urgency versus musculoskeletal pain  Telemetry monitoring

## 2024-04-05 NOTE — PROGRESS NOTES
Pt in nuc med in distress and crying complaining of active chest pain 7/10 unable to stay still for completion exam at this time. Pt is aware, RN made aware. Pt en route to floor via transport.

## 2024-04-05 NOTE — ED NOTES
Medicated per MAR. Pt denies further needs at this time.    VTE Assessment already completed for this visit

## 2024-04-05 NOTE — ED PROVIDER NOTES
ED Provider Note    CHIEF COMPLAINT  Chief Complaint   Patient presents with    Chest Pain    Dizziness       EXTERNAL RECORDS REVIEWED  External ED Note outside ER 6/7/2023 for migraine.  Previous ER visit for opioid withdrawal vomiting and diarrhea in January 2023    HPI/ROS  LIMITATION TO HISTORY   Select: : None  OUTSIDE HISTORIAN(S):  Parent mom, dad    Alan Penaloza is a 43 y.o. female who presents due to sudden onset of chest pain this evening.  Patient was standing in her parents kitchen when she began feeling dizzy and suddenly had sharp chest pain on the left side.  Family notes she almost passed out from this event.  She notes numbness to her face after the event.  She has not had symptoms like this before.  She did not take anything for her symptoms.  She does note she has been out of her blood pressure medicines for the last few days as she is here from out of town.  She is post to be on lisinopril, propranolol, verapamil and other medications due to significant elevated blood pressures prescribed by her OB/GYN.  She was recently pregnant with twins and had a miscarriage.  This was not medically or surgically managing with spontaneous.  She notes she is still passing clots but bleeding has overall slowed.  She has not been having much abdominal pain.  She is nauseous but not vomiting.  No recent fevers.  No dysuria or hematuria.  Denies cardiac history.    PAST MEDICAL HISTORY   has a past medical history of Abdominal pain (1/13/2011), Endometriosis, Hydronephrosis, right (1/13/2011), Kidney stones, Ovarian torsion, Psychiatric problem, Renal calculi (1/13/2011), and Right flank pain (1/13/2011).    SURGICAL HISTORY   has a past surgical history that includes other abdominal surgery and gyn surgery.    FAMILY HISTORY  No family history on file.    SOCIAL HISTORY  Social History     Tobacco Use    Smoking status: Every Day    Smokeless tobacco: Never   Substance and Sexual Activity    Alcohol use: No  "   Drug use: No    Sexual activity: Not on file       CURRENT MEDICATIONS  Home Medications       Reviewed by Ashleigh Rivera R.N. (Registered Nurse) on 04/04/24 at 2309  Med List Status: Partial     Medication Last Dose Status   azithromycin (ZITHROMAX) 500 MG tablet  Active   Guaifenesin 1200 MG TABLET SR 12 HR  Active   lisinopril (PRINIVIL) 10 MG Tab  Active   methylPREDNISolone (MEDROL DOSEPAK) 4 MG Tablet Therapy Pack  Active   omeprazole (PRILOSEC) 20 MG delayed-release capsule  Active                    ALLERGIES  Allergies   Allergen Reactions    Ativan [Lorazepam]      Pt reports that it makes her hallucinate     Droperidol Hives and Shortness of Breath    Iodine Rash    Penicillins Hives    Phenergan [Promethazine Hcl] Hives    Reglan [Metoclopramide Hcl] Hives    Rocephin [Ceftriaxone Sodium] Hives    Toradol Vomiting and Nausea       PHYSICAL EXAM  VITAL SIGNS: BP (!) 142/70   Pulse 70   Temp 37 °C (98.6 °F) (Temporal)   Resp 13   Ht 1.676 m (5' 6\")   Wt 81.2 kg (179 lb)   LMP 05/06/2014 Comment: recent misscarriage  SpO2 95%   BMI 28.89 kg/m²    Constitutional: Awake and alert. No acute distress.  Resting with eyes closed  Head: NCAT.  HEENT: Normal Conjunctiva. PERRLA.  Neck: Grossly normal range of motion. Airway midline.  Cardiovascular: Normal heart rate, Normal rhythm.  No lower extremity swelling  Thorax & Lungs: No respiratory distress. Clear to Auscultation bilaterally.  Abdomen: Normal inspection. Nontender. Nondistended  Skin: No obvious rash.  Back: No tenderness, No CVA tenderness.   Musculoskeletal: No obvious deformity. Moves all extremities Well.  Neurologic: A&Ox3.   strength is equal, straight leg raise bilaterally equal.  Psychiatric: Mood and affect are appropriate for situation.    EKG/LABS  Results for orders placed or performed during the hospital encounter of 04/04/24   CBC with Differential   Result Value Ref Range    WBC 18.3 (H) 4.8 - 10.8 K/uL    RBC 4.89 4.20 - " 5.40 M/uL    Hemoglobin 14.2 12.0 - 16.0 g/dL    Hematocrit 42.0 37.0 - 47.0 %    MCV 85.9 81.4 - 97.8 fL    MCH 29.0 27.0 - 33.0 pg    MCHC 33.8 32.2 - 35.5 g/dL    RDW 41.2 35.9 - 50.0 fL    Platelet Count 467 (H) 164 - 446 K/uL    MPV 8.6 (L) 9.0 - 12.9 fL    Neutrophils-Polys 75.70 (H) 44.00 - 72.00 %    Lymphocytes 15.00 (L) 22.00 - 41.00 %    Monocytes 7.60 0.00 - 13.40 %    Eosinophils 0.70 0.00 - 6.90 %    Basophils 0.50 0.00 - 1.80 %    Immature Granulocytes 0.50 0.00 - 0.90 %    Nucleated RBC 0.00 0.00 - 0.20 /100 WBC    Neutrophils (Absolute) 13.87 (H) 1.82 - 7.42 K/uL    Lymphs (Absolute) 2.74 1.00 - 4.80 K/uL    Monos (Absolute) 1.40 (H) 0.00 - 0.85 K/uL    Eos (Absolute) 0.12 0.00 - 0.51 K/uL    Baso (Absolute) 0.09 0.00 - 0.12 K/uL    Immature Granulocytes (abs) 0.09 0.00 - 0.11 K/uL    NRBC (Absolute) 0.00 K/uL   Complete Metabolic Panel (CMP)   Result Value Ref Range    Sodium 139 135 - 145 mmol/L    Potassium 3.2 (L) 3.6 - 5.5 mmol/L    Chloride 102 96 - 112 mmol/L    Co2 23 20 - 33 mmol/L    Anion Gap 14.0 7.0 - 16.0    Glucose 137 (H) 65 - 99 mg/dL    Bun 16 8 - 22 mg/dL    Creatinine 1.09 0.50 - 1.40 mg/dL    Calcium 9.9 8.5 - 10.5 mg/dL    Correct Calcium 9.3 8.5 - 10.5 mg/dL    AST(SGOT) 11 (L) 12 - 45 U/L    ALT(SGPT) 21 2 - 50 U/L    Alkaline Phosphatase 70 30 - 99 U/L    Total Bilirubin 0.4 0.1 - 1.5 mg/dL    Albumin 4.7 3.2 - 4.9 g/dL    Total Protein 7.9 6.0 - 8.2 g/dL    Globulin 3.2 1.9 - 3.5 g/dL    A-G Ratio 1.5 g/dL   Troponins NOW   Result Value Ref Range    Troponin T 10 6 - 19 ng/L   HCG Qual Serum   Result Value Ref Range    Beta-Hcg Qualitative Serum Negative Negative   PROTHROMBIN TIME (INR)   Result Value Ref Range    PT 14.3 12.0 - 14.6 sec    INR 1.10 0.87 - 1.13   APTT   Result Value Ref Range    APTT 28.5 24.7 - 36.0 sec   D-DIMER   Result Value Ref Range    D-Dimer <0.27 0.00 - 0.50 ug/mL (FEU)   ESTIMATED GFR   Result Value Ref Range    GFR (CKD-EPI) 64 >60 mL/min/1.73 m  2   EKG   Result Value Ref Range    Report       [facility]    Test Date:  2024  Pt Name:    GUERITA ARMAS                 Department: ER  MRN:        [id number]                      Room:  Gender:     Female                       Technician: [id number]  :        1980                   Requested By:ER TRIAGE PROTOCOL  Order #:    [id number]                    Reading MD:    Measurements  Intervals                                Axis  Rate:       75                           P:          41  WY:         151                          QRS:        21  QRSD:       91                           T:          -79  QT:         400  QTc:        447    Interpretive Statements  Sinus rhythm  Probable left atrial enlargement  Probable anteroseptal infarct, old  Abnormal T, consider ischemia, diffuse leads  Compared to ECG 2020 23:27:08  Myocardial infarct finding now present  T-wave abnormality now present  Possible ischemia now present     EKG   Result Value Ref Range    Report       [facility]    Test Date:  2024  Pt Name:    GUERITA ARMAS                 Department: ER  MRN:        [id number]                      Room:  Gender:     Female                       Technician: [id number]  :        1980                   Requested By:ER TRIAGE PROTOCOL  Order #:    [id number]                    Reading MD:    Measurements  Intervals                                Axis  Rate:       78                           P:          20  WY:         146                          QRS:        0  QRSD:       80                           T:          -75  QT:         385  QTc:        439    Interpretive Statements  Sinus rhythm  Probable left atrial enlargement  Anteroseptal infarct, old  Abnormal T, consider ischemia, diffuse leads  Compared to ECG 2024 22:57:52  No significant changes     POCT glucose device results   Result Value Ref Range    POC Glucose,  Blood 135 (H) 65 - 99 mg/dL     I have independently interpreted this EKG    RADIOLOGY  I have independently interpreted the diagnostic imaging associated with this visit and am waiting the final reading from the radiologist.   My preliminary interpretation is as follows: no opacity, no effusion.  CT without ICH    Radiologist interpretation:  CT-HEAD W/O   Final Result         1.  No acute intracranial abnormality.         DX-CHEST-PORTABLE (1 VIEW)   Final Result         1.  No acute cardiopulmonary disease.          COURSE & MEDICAL DECISION MAKING    ASSESSMENT, COURSE AND PLAN  Care Narrative:   43-year-old female history notable for recent miscarriage of twins as well as extensive blood pressure issues during the pregnancy with blood pressure management by her OB/GYN presents for chest pain, dizziness and near syncope while at home standing  Afebrile, hypertensive, no hypoxia  On exam nonfocal neurologic exam, no murmur, no lower extremity swelling.  She is in discomfort with eyes closed.  Differential includes ACS, cardiogenic syncope, neurogenic syncope, pneumonia, PE.  EKG from triage with diffuse T wave inversions.  Prior comparison is 2020 and normal.  Patient relates no heart history in the past 4 years other than the hypertension for which she was on several agents by her OB/GYN.  EKG repeated 30 minutes later and no change but still with inversions.  Labs with leukocytosis to 18, thrombocytosis, potassium 3.2, D-dimer is negative, troponin is 10 and will be repeated.  Given her complaint of dizziness, light sensitivity and near syncope will obtain CT to evaluate for neurogenic syncope and CTs without intracranial hemorrhage.  Workup will be brought in for leukocytosis to 18 however given her T wave inversions she will be admitted for abnormal EKG.  Potassium was repleted and we will obtain a magnesium to evaluate for hypomagnesemia.    Chest Pain: Heart score 4          ADDITIONAL PROBLEMS  MANAGED    Hyperglycemia  Dizziness  Leukocytosis  thrombocytosis    DISPOSITION AND DISCUSSIONS  I have discussed management of the patient with the following physicians and DEREK's:  Dr. Morales - admission    Discussion of management with other QHP or appropriate source(s): None     Barriers to care at this time, including but not limited to: Patient does not have established PCP and Patient lacks financial resources.     Decision tools and prescription drugs considered including, but not limited to: NEXUS criteria no neck pain or tenderness. .    FINAL DIAGNOSIS  1. EKG abnormality    2. Chest pain, unspecified type    3. Hypokalemia    4. Dizziness    5. Primary hypertension           Electronically signed by: Paco Mcpherson D.O., 4/4/2024 11:30 PM

## 2024-04-05 NOTE — ED TRIAGE NOTES
.  Chief Complaint   Patient presents with    Chest Pain    Dizziness   BIBA from home.  Pt c/o of dizziness, hot flashes, +LOC along with sharp chest pain at about 9pm. Non radiating. Describes pain as a pressure at this time. Tingling in palms of hand.   Bilateral blood pressures within 20mmHG systolic.   -SOB.   Protocol ordered. EKG preformed by tech and given to ERP.   Pt Aox4, pt appears lethargic in triage.  FSBS 132.   Pt states took 1000mg naproxen PTA.   Pt assigned to room. RN notified.

## 2024-04-05 NOTE — PROGRESS NOTES
Pt transported to CT and then to CDU with all belongings on Cardiac Monitor. RN and Monitor tech notified on arrival.

## 2024-04-05 NOTE — PROGRESS NOTES
4 Eyes Skin Assessment Completed by CONNOR Avila and Michele ACT-A    Head WDL  Ears WDL  Nose WDL  Mouth WDL  Neck WDL  Breast/Chest WDL  Shoulder Blades WDL  Spine WDL  (R) Arm/Elbow/Hand WDL  (L) Arm/Elbow/Hand WDL  Abdomen WDL  Groin WDL  Scrotum/Coccyx/Buttocks WDL  (R) Leg WDL  (L) Leg WDL  (R) Heel/Foot/Toe WDL  (L) Heel/Foot/Toe WDL          Devices In Places Blood Pressure Cuff      Interventions In Place Pillows    Possible Skin Injury No    Pictures Uploaded Into Epic N/A  Wound Consult Placed N/A  RN Wound Prevention Protocol Ordered No

## 2024-04-05 NOTE — ED NOTES
Break RN: Patient to BL 17, changed into gown and placed on monitor, ERP at bedside at this time.

## 2024-04-05 NOTE — H&P
Hospital Medicine History & Physical Note    Date of Service  4/5/2024    Primary Care Physician  Pcp Pt States None    Consultants      Specialist Names:     Code Status  Full Code    Chief Complaint  Chief Complaint   Patient presents with    Chest Pain    Dizziness       History of Presenting Illness  Alan Penaloza is a 43 y.o. female who presented 4/4/2024 with past medical history of hypertension, nephrolithiasis, ovarian torsion who presents to the hospital for chest pain and dizziness.  She describes the pain as a pressure-like sensation.  Patient had a syncopal event today.  Patient was having some dizziness prior to this event.  She denies any exertional chest pain.  Patient states that she recently had a miscarriage with twins 1 week prior.  She is been following her OB/GYN who has been testing her hCG levels to see if they are trending downwards.  Patient states that she is still having significant amounts of vaginal bleeding with clots.  Patient is never had any heart related issues in the past.      EKG interpreted by me found normal sinus rhythm, LVH with T wave inversions  Chest x-ray interpreted by me found no acute pulmonary process    I discussed the plan of care with patient.    Review of Systems  Review of Systems   Constitutional:  Positive for malaise/fatigue. Negative for chills, diaphoresis and fever.   HENT:  Negative for congestion, ear discharge, ear pain, hearing loss, nosebleeds, sinus pain, sore throat and tinnitus.    Eyes:  Negative for blurred vision, double vision, photophobia and pain.   Respiratory:  Positive for shortness of breath. Negative for cough, hemoptysis, sputum production, wheezing and stridor.    Cardiovascular:  Positive for chest pain. Negative for palpitations, orthopnea, claudication, leg swelling and PND.   Gastrointestinal:  Positive for nausea. Negative for abdominal pain, blood in stool, constipation, diarrhea, heartburn, melena and vomiting.    Genitourinary:  Negative for dysuria, flank pain, frequency, hematuria and urgency.   Musculoskeletal:  Negative for back pain, falls, joint pain, myalgias and neck pain.   Skin:  Negative for itching and rash.   Neurological:  Positive for dizziness. Negative for tingling, tremors, weakness and headaches.   Endo/Heme/Allergies:  Negative for environmental allergies and polydipsia. Does not bruise/bleed easily.   Psychiatric/Behavioral:  Negative for depression, hallucinations, substance abuse and suicidal ideas.        Past Medical History   has a past medical history of Abdominal pain (1/13/2011), Endometriosis, Hydronephrosis, right (1/13/2011), Kidney stones, Ovarian torsion, Psychiatric problem, Renal calculi (1/13/2011), and Right flank pain (1/13/2011).    Surgical History   has a past surgical history that includes other abdominal surgery and gyn surgery.     Family History  Family history reviewed with patient. There is no family history that is pertinent to the chief complaint.     Social History   reports that she has been smoking. She has never used smokeless tobacco. She reports that she does not drink alcohol and does not use drugs.    Allergies  Allergies   Allergen Reactions    Ativan [Lorazepam]      Pt reports that it makes her hallucinate     Droperidol Hives and Shortness of Breath    Iodine Rash          Penicillins Hives    Phenergan [Promethazine Hcl] Hives    Reglan [Metoclopramide Hcl] Hives    Rocephin [Ceftriaxone Sodium] Hives    Toradol Vomiting and Nausea       Medications  Prior to Admission Medications   Prescriptions Last Dose Informant Patient Reported? Taking?   VERAPAMIL HCL PO 4/1/2024 at PM Patient Yes No   Sig: Take 1 Tablet by mouth every day.   hydroCHLOROthiazide 25 MG Tab 4/1/2024 at PM Patient Yes No   Sig: Take 25 mg by mouth every day.   lisinopril (PRINIVIL) 20 MG Tab 4/1/2024 at PM Patient Yes No   Sig: Take 20 mg by mouth 2 times a day.   morphine ER (MS CONTIN) 30  MG Tab CR tablet 4/1/2024 at PM Patient Yes Yes   Sig: Take 30 mg by mouth 3 times a day.   naproxen sodium (ANAPROX) 550 MG tablet 4/4/2024 at PM Patient Yes Yes   Sig: Take 550-1,100 mg by mouth 3 times a day as needed.   oxyCODONE-acetaminophen (PERCOCET-10)  MG Tab 4/1/2024 at PM Patient Yes Yes   Sig: Take 0.5-1 Tablets by mouth 3 times a day.   propranolol (INDERAL) 20 MG Tab 4/1/2024 at PM Patient Yes No   Sig: Take 20 mg by mouth 2 times a day.      Facility-Administered Medications: None       Physical Exam  Temp:  [37 °C (98.6 °F)] 37 °C (98.6 °F)  Pulse:  [70-85] 70  Resp:  [13-18] 13  BP: (125-145)/(70-90) 142/70  SpO2:  [93 %-96 %] 95 %  Blood Pressure: (!) 142/70   Temperature: 37 °C (98.6 °F)   Pulse: 70   Respiration: 13   Pulse Oximetry: 95 %       Physical Exam  Vitals and nursing note reviewed.   Constitutional:       General: She is not in acute distress.     Appearance: Normal appearance. She is not ill-appearing, toxic-appearing or diaphoretic.   HENT:      Head: Normocephalic and atraumatic.      Nose: No congestion or rhinorrhea.      Mouth/Throat:      Pharynx: No oropharyngeal exudate or posterior oropharyngeal erythema.   Eyes:      General: No scleral icterus.  Neck:      Vascular: No carotid bruit or JVD.   Cardiovascular:      Rate and Rhythm: Normal rate and regular rhythm.      Pulses: Normal pulses.      Heart sounds: Normal heart sounds. No murmur heard.     No friction rub. No gallop.   Pulmonary:      Effort: Pulmonary effort is normal. No respiratory distress.      Breath sounds: No stridor. No wheezing, rhonchi or rales.   Abdominal:      General: Abdomen is flat. There is no distension.      Palpations: There is no mass.      Tenderness: There is no abdominal tenderness. There is no left CVA tenderness, guarding or rebound.      Hernia: No hernia is present.   Musculoskeletal:         General: No swelling. Normal range of motion.      Cervical back: No rigidity. No  "muscular tenderness.      Right lower leg: No edema.      Left lower leg: No edema.   Lymphadenopathy:      Cervical: No cervical adenopathy.   Skin:     General: Skin is warm and dry.      Capillary Refill: Capillary refill takes more than 3 seconds.      Coloration: Skin is not jaundiced or pale.      Findings: No bruising or erythema.   Neurological:      Mental Status: She is alert.         Laboratory:  Recent Labs     04/04/24  2355   WBC 18.3*   RBC 4.89   HEMOGLOBIN 14.2   HEMATOCRIT 42.0   MCV 85.9   MCH 29.0   MCHC 33.8   RDW 41.2   PLATELETCT 467*   MPV 8.6*     Recent Labs     04/04/24  2355   SODIUM 139   POTASSIUM 3.2*   CHLORIDE 102   CO2 23   GLUCOSE 137*   BUN 16   CREATININE 1.09   CALCIUM 9.9     Recent Labs     04/04/24  2355   ALTSGPT 21   ASTSGOT 11*   ALKPHOSPHAT 70   TBILIRUBIN 0.4   GLUCOSE 137*     Recent Labs     04/04/24  2355   APTT 28.5   INR 1.10     No results for input(s): \"NTPROBNP\" in the last 72 hours.      Recent Labs     04/04/24 2355 04/05/24  0139   TROPONINT 10 7       Imaging:  CT-HEAD W/O   Final Result         1.  No acute intracranial abnormality.         DX-CHEST-PORTABLE (1 VIEW)   Final Result         1.  No acute cardiopulmonary disease.      CT-ABDOMEN-PELVIS WITH    (Results Pending)   EC-ECHOCARDIOGRAM COMPLETE W/O CONT    (Results Pending)   NM-CARDIAC STRESS TEST    (Results Pending)       X-Ray:  I have personally reviewed the images and compared with prior images.  EKG:  I have personally reviewed the images and compared with prior images.    Assessment/Plan:  Justification for Admission Status  I anticipate this patient is appropriate for observation status at this time because  chest pain    Patient will need a Telemetry bed on MEDICAL service .  The need is secondary to chest pain.    * Stable angina (HCC)- (present on admission)  Assessment & Plan  Rule out ACS  Initial EKG and troponin negative for ischemia  Cardiac echo has been ordered  Continuous " cardiac monitoring with serial EKG and troponin  Nuclear medicine cardiac stress test in the morning if troponin remains negative  Patient has been given full dose of aspirin  Check lipid panel, TSH and hemoglobin A1c  Nitro when necessary for chest pain      Miscarriage  Assessment & Plan  Obtain a CT scan of the abdomen and pelvis  Check hCG levels    HTN (hypertension)- (present on admission)  Assessment & Plan  controlled  Continue current home medications  IV as needed medications have been ordered      Tobacco abuse- (present on admission)  Assessment & Plan  Tobacco cessation education provided for more than 5 minutes.  We discussed the risks of smoking including increased risk of heart disease, stroke, cancer and COPD. We discussed the benefits of quitting smoking. We discussed options of nicotine patch, wellbutrin and chantix.  The patient has the intention to quit smoking. Nicotine replacement protocol will be provided to the patient.          VTE prophylaxis: SCDs/TEDs

## 2024-04-05 NOTE — PROGRESS NOTES
"Notified by bedside RN that patient would like to speak to an . This RN, acting as both Charge RN and Supervisor for unit spoke with bedside RN. Patient admitted for chest pain. Chart reviewed. Received 50mcg Fentanyl, 4mg morphine, 25mg meclizine and 50mg benadryl in ED. Patient was drowsy and somnolent most of night and into morning per bedside RN. Was still c/o intermittent chest pain when she was awake. Bedside RN explained that pain should try and managed by other means before narcotics were used. Bedside RN left room, and when CNA went into room, patient told them \" I don't want that nurse in here anymore.\" Bedside RN explained situation to this RN prior to entering room.This RN went into room to try and deescalate situation. Patient told this RN \"get out, you were unprofessional. I heard you talking shit about me in the hallway to everybody, saying \"Wow she wants fucking fentanyl.\".\" Patient was visibly crying and yelling at this RN. This RN tried to calm patient down and explain situation, but patient kept screaming \"get out, you're being unprofessional.\" Another staff member called security to bedside. Security attempted to deescalte situation. Patient given number to RODRIGO. RODRIGO Smiley called this RN, and situation was explained. NAM came to patient bedside and was able to deescalate situation. AYLA Faria came to bedside to update her on plan of care. Orders for echo and stress test pending. It was explained to patient that non-opiate medications should be tried, including Toradol or Tramadol. Patient stated she is allergic to those medications. Order for 2mg morphine placed by AYLA. Patient was medicated per MAR. Nursing assignment was changed per patient request. Situation resolved in moment by NAM.  "

## 2024-04-05 NOTE — ED NOTES
Med rec complete per patient  Allergies reviewed.   Outpatient antibiotics in the last 30 days? No   Anticoagulants taken in the last 14 days? No    Patient reports she has been visiting from [place] and only brought enough medication for 1 day, pt became stuck in [place] and has subsequently been out of meds for 3 days now    Pharm[facility] patient utilizes: [facility]

## 2024-04-05 NOTE — PROGRESS NOTES
"Report received from Noc RN. Assume care. Pt resting comfortably  AAOx4. Pt requesting help to walk to the bathroom, CNA and RN helped PT. Pt is unsteady and stumbling while ambulating to bathroom, needing cues to keep eyes open. Pt was informed about ST around , we will continue to assess VS prior narcotics due to her drowsiness, Pt aware of POC.White board updated, All question answered. Pt has call light within reach,  bed is in the lowest position. Pt has no other needs at this time.   0950 Pt states \"I don't want you here, leave\" RN agreed and left room.   1000 Hoang supervisor at bedside. Pt screaming at him. Security called.   1008 Security at bedside  1115 Azra RN at bedside medicating Pt.  1126 Pt off the unit to get ST done via W/C and transport staff  1402 Per CNA Pt is crying and BP is high. Awaiting for med to be approved.       "

## 2024-04-06 ENCOUNTER — PHARMACY VISIT (OUTPATIENT)
Dept: PHARMACY | Facility: MEDICAL CENTER | Age: 44
End: 2024-04-06
Payer: COMMERCIAL

## 2024-04-06 VITALS
BODY MASS INDEX: 28.45 KG/M2 | HEIGHT: 66 IN | SYSTOLIC BLOOD PRESSURE: 125 MMHG | RESPIRATION RATE: 18 BRPM | DIASTOLIC BLOOD PRESSURE: 70 MMHG | OXYGEN SATURATION: 97 % | WEIGHT: 177.03 LBS | TEMPERATURE: 97.9 F | HEART RATE: 68 BPM

## 2024-04-06 PROBLEM — I16.0 HYPERTENSIVE URGENCY: Status: RESOLVED | Noted: 2024-04-05 | Resolved: 2024-04-06

## 2024-04-06 PROBLEM — E78.5 HYPERLIPIDEMIA: Status: ACTIVE | Noted: 2024-04-06

## 2024-04-06 LAB
ALBUMIN SERPL BCP-MCNC: 4.1 G/DL (ref 3.2–4.9)
ALBUMIN/GLOB SERPL: 1.5 G/DL
ALP SERPL-CCNC: 58 U/L (ref 30–99)
ALT SERPL-CCNC: 13 U/L (ref 2–50)
ANION GAP SERPL CALC-SCNC: 14 MMOL/L (ref 7–16)
AST SERPL-CCNC: 11 U/L (ref 12–45)
BASOPHILS # BLD AUTO: 0.2 % (ref 0–1.8)
BASOPHILS # BLD: 0.04 K/UL (ref 0–0.12)
BILIRUB SERPL-MCNC: 0.3 MG/DL (ref 0.1–1.5)
BUN SERPL-MCNC: 18 MG/DL (ref 8–22)
CALCIUM ALBUM COR SERPL-MCNC: 9.4 MG/DL (ref 8.5–10.5)
CALCIUM SERPL-MCNC: 9.5 MG/DL (ref 8.5–10.5)
CHLORIDE SERPL-SCNC: 103 MMOL/L (ref 96–112)
CO2 SERPL-SCNC: 20 MMOL/L (ref 20–33)
CREAT SERPL-MCNC: 0.69 MG/DL (ref 0.5–1.4)
EOSINOPHIL # BLD AUTO: 0.07 K/UL (ref 0–0.51)
EOSINOPHIL NFR BLD: 0.4 % (ref 0–6.9)
ERYTHROCYTE [DISTWIDTH] IN BLOOD BY AUTOMATED COUNT: 43.2 FL (ref 35.9–50)
GFR SERPLBLD CREATININE-BSD FMLA CKD-EPI: 109 ML/MIN/1.73 M 2
GLOBULIN SER CALC-MCNC: 2.8 G/DL (ref 1.9–3.5)
GLUCOSE SERPL-MCNC: 103 MG/DL (ref 65–99)
HCT VFR BLD AUTO: 37.8 % (ref 37–47)
HGB BLD-MCNC: 13.2 G/DL (ref 12–16)
IMM GRANULOCYTES # BLD AUTO: 0.08 K/UL (ref 0–0.11)
IMM GRANULOCYTES NFR BLD AUTO: 0.4 % (ref 0–0.9)
LYMPHOCYTES # BLD AUTO: 4.77 K/UL (ref 1–4.8)
LYMPHOCYTES NFR BLD: 26.4 % (ref 22–41)
MCH RBC QN AUTO: 30.1 PG (ref 27–33)
MCHC RBC AUTO-ENTMCNC: 34.9 G/DL (ref 32.2–35.5)
MCV RBC AUTO: 86.1 FL (ref 81.4–97.8)
MONOCYTES # BLD AUTO: 1.42 K/UL (ref 0–0.85)
MONOCYTES NFR BLD AUTO: 7.9 % (ref 0–13.4)
NEUTROPHILS # BLD AUTO: 11.7 K/UL (ref 1.82–7.42)
NEUTROPHILS NFR BLD: 64.7 % (ref 44–72)
NRBC # BLD AUTO: 0 K/UL
NRBC BLD-RTO: 0 /100 WBC (ref 0–0.2)
PLATELET # BLD AUTO: 402 K/UL (ref 164–446)
PMV BLD AUTO: 8.5 FL (ref 9–12.9)
POTASSIUM SERPL-SCNC: 3.7 MMOL/L (ref 3.6–5.5)
PROT SERPL-MCNC: 6.9 G/DL (ref 6–8.2)
RBC # BLD AUTO: 4.39 M/UL (ref 4.2–5.4)
SODIUM SERPL-SCNC: 137 MMOL/L (ref 135–145)
WBC # BLD AUTO: 18.1 K/UL (ref 4.8–10.8)

## 2024-04-06 PROCEDURE — 96376 TX/PRO/DX INJ SAME DRUG ADON: CPT

## 2024-04-06 PROCEDURE — 80053 COMPREHEN METABOLIC PANEL: CPT

## 2024-04-06 PROCEDURE — G0378 HOSPITAL OBSERVATION PER HR: HCPCS

## 2024-04-06 PROCEDURE — 85025 COMPLETE CBC W/AUTO DIFF WBC: CPT

## 2024-04-06 PROCEDURE — 700102 HCHG RX REV CODE 250 W/ 637 OVERRIDE(OP): Performed by: HOSPITALIST

## 2024-04-06 PROCEDURE — 700105 HCHG RX REV CODE 258

## 2024-04-06 PROCEDURE — RXMED WILLOW AMBULATORY MEDICATION CHARGE

## 2024-04-06 PROCEDURE — A9270 NON-COVERED ITEM OR SERVICE: HCPCS | Performed by: HOSPITALIST

## 2024-04-06 PROCEDURE — 700102 HCHG RX REV CODE 250 W/ 637 OVERRIDE(OP)

## 2024-04-06 PROCEDURE — A9270 NON-COVERED ITEM OR SERVICE: HCPCS

## 2024-04-06 PROCEDURE — 99239 HOSP IP/OBS DSCHRG MGMT >30: CPT | Mod: FS | Performed by: STUDENT IN AN ORGANIZED HEALTH CARE EDUCATION/TRAINING PROGRAM

## 2024-04-06 PROCEDURE — 700111 HCHG RX REV CODE 636 W/ 250 OVERRIDE (IP)

## 2024-04-06 RX ORDER — LISINOPRIL 20 MG/1
20 TABLET ORAL 2 TIMES DAILY
Qty: 4 TABLET | Refills: 0 | Status: SHIPPED | OUTPATIENT
Start: 2024-04-06 | End: 2024-04-08

## 2024-04-06 RX ORDER — HYDROCHLOROTHIAZIDE 25 MG/1
25 TABLET ORAL DAILY
Qty: 2 TABLET | Refills: 0 | Status: SHIPPED | OUTPATIENT
Start: 2024-04-06 | End: 2024-04-08

## 2024-04-06 RX ORDER — SODIUM CHLORIDE, SODIUM LACTATE, POTASSIUM CHLORIDE, AND CALCIUM CHLORIDE .6; .31; .03; .02 G/100ML; G/100ML; G/100ML; G/100ML
500 INJECTION, SOLUTION INTRAVENOUS ONCE
Status: COMPLETED | OUTPATIENT
Start: 2024-04-06 | End: 2024-04-06

## 2024-04-06 RX ORDER — MORPHINE SULFATE 30 MG/1
30 TABLET, FILM COATED, EXTENDED RELEASE ORAL 3 TIMES DAILY
Qty: 6 TABLET | Refills: 0 | Status: SHIPPED | OUTPATIENT
Start: 2024-04-06 | End: 2024-04-08

## 2024-04-06 RX ORDER — PROPRANOLOL HYDROCHLORIDE 20 MG/1
20 TABLET ORAL 2 TIMES DAILY
Qty: 4 TABLET | Refills: 0 | Status: SHIPPED | OUTPATIENT
Start: 2024-04-06 | End: 2024-04-08

## 2024-04-06 RX ORDER — VERAPAMIL HYDROCHLORIDE 120 MG/1
120 TABLET, FILM COATED ORAL DAILY
Qty: 2 TABLET | Refills: 0 | Status: SHIPPED | OUTPATIENT
Start: 2024-04-06 | End: 2024-04-08

## 2024-04-06 RX ORDER — OXYCODONE AND ACETAMINOPHEN 10; 325 MG/1; MG/1
.5-1 TABLET ORAL 3 TIMES DAILY
Qty: 6 TABLET | Refills: 0 | Status: SHIPPED | OUTPATIENT
Start: 2024-04-06 | End: 2024-04-08

## 2024-04-06 RX ADMIN — OXYCODONE 5 MG: 5 TABLET ORAL at 09:01

## 2024-04-06 RX ADMIN — HYDROCHLOROTHIAZIDE 25 MG: 25 TABLET ORAL at 09:01

## 2024-04-06 RX ADMIN — LISINOPRIL 20 MG: 20 TABLET ORAL at 06:40

## 2024-04-06 RX ADMIN — ASPIRIN 81 MG: 81 TABLET, COATED ORAL at 04:45

## 2024-04-06 RX ADMIN — MORPHINE SULFATE 30 MG: 30 TABLET, FILM COATED, EXTENDED RELEASE ORAL at 04:45

## 2024-04-06 RX ADMIN — SODIUM CHLORIDE, POTASSIUM CHLORIDE, SODIUM LACTATE AND CALCIUM CHLORIDE 500 ML: 600; 310; 30; 20 INJECTION, SOLUTION INTRAVENOUS at 01:43

## 2024-04-06 RX ADMIN — HYDROMORPHONE HYDROCHLORIDE 1 MG: 1 INJECTION, SOLUTION INTRAMUSCULAR; INTRAVENOUS; SUBCUTANEOUS at 00:17

## 2024-04-06 RX ADMIN — HYDROMORPHONE HYDROCHLORIDE 1 MG: 1 INJECTION, SOLUTION INTRAMUSCULAR; INTRAVENOUS; SUBCUTANEOUS at 06:39

## 2024-04-06 RX ADMIN — PROPRANOLOL HYDROCHLORIDE 20 MG: 20 TABLET ORAL at 06:40

## 2024-04-06 ASSESSMENT — PAIN DESCRIPTION - PAIN TYPE
TYPE: ACUTE PAIN;CHRONIC PAIN

## 2024-04-06 NOTE — PROGRESS NOTES
Monitor summary:  Simus rhythm  Rate 61-90  PVC rare/couplet ectopy  0.15/0.08/0.40
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
## 2024-04-06 NOTE — DISCHARGE INSTRUCTIONS
Discharge Instructions per JOHANNA Fofana.     -Continue taking all home blood pressure medications.  -Continue pain management regimen that your primary care have prescribed with MS Contin and Percocet.  -I have prescribed you 2 days worth of all of your home medication regimen to get you home.  -Follow-up with your primary care provider status post hospitalization.  Continue further discussion on lifestyle management versus medication therapy for hyperlipidemia.     DIET: As tolerated     ACTIVITY: As tolerated     DIAGNOSIS: Chest pain     Return to ER if you start to experience any numbness and tingling, chest pain, palpitations, or shortness of breath.

## 2024-04-06 NOTE — PROGRESS NOTES
Monitor Summary:  Rhythm: SR  Rate: 64-73  Ectopy: NA    0.15/0.09/0.39    Per monitor room interpretation.

## 2024-04-06 NOTE — PROGRESS NOTES
Assumed care of pt. Pt alert and oriented x4. Pt pain 6/10. Call light in reach. Bed in lowest position. Care of plan discussed with pt with pt agreeing to care of plan. Communication board updated. All questions answered. Assessment completed.

## 2024-04-06 NOTE — PROGRESS NOTES
Patient arrived to [facility]. [place] paper work, meds, and follow up appointments reviewed with patient. Questions addressed. Ride home with family.

## 2024-04-06 NOTE — CARE PLAN
The patient is Stable - Low risk of patient condition declining or worsening    Shift Goals  Clinical Goals: Tele monitoring, Rest, Pain control  Patient Goals: Rest  Family Goals: Not at the bedside      Problem: Knowledge Deficit - Standard  Goal: Patient and family/care givers will demonstrate understanding of plan of care, disease process/condition, diagnostic tests and medications  Description: Target End Date:  4/6/24    1.  Patient oriented to unit, equipment, visitation policy and means for communicating concern  2.  Complete/review Learning Assessment  3.  Assess knowledge level of disease process/condition, treatment plan, diagnostic tests and medications  4.  Explain disease process/condition, treatment plan, diagnostic tests and medications  Outcome: Progressing     Problem: Pain - Standard  Goal: Alleviation of pain or a reduction in pain to the patient’s comfort goal  Description: Target End Date:  4/6/24    1.  Document pain using the appropriate pain scale per order or unit policy  2.  Educate and implement non-pharmacologic comfort measures (i.e. relaxation, distraction, massage, cold/heat therapy, etc.)  3.  Pain management medications as ordered  4.  Reassess pain after pain med administration per policy  5.  If opiods administered assess patient's response to pain medication is appropriate per POSS sedation scale  6.  Follow pain management plan developed in collaboration with patient and interdisciplinary team (including palliative care or pain specialists if applicable)  Outcome: Progressing